# Patient Record
Sex: MALE | Race: WHITE | NOT HISPANIC OR LATINO | Employment: FULL TIME | URBAN - METROPOLITAN AREA
[De-identification: names, ages, dates, MRNs, and addresses within clinical notes are randomized per-mention and may not be internally consistent; named-entity substitution may affect disease eponyms.]

---

## 2017-01-18 ENCOUNTER — COMMUNICATION - HEALTHEAST (OUTPATIENT)
Dept: FAMILY MEDICINE | Facility: CLINIC | Age: 57
End: 2017-01-18

## 2017-01-18 DIAGNOSIS — K21.9 ACID REFLUX: ICD-10-CM

## 2017-01-19 ENCOUNTER — AMBULATORY - HEALTHEAST (OUTPATIENT)
Dept: FAMILY MEDICINE | Facility: CLINIC | Age: 57
End: 2017-01-19

## 2017-03-02 ENCOUNTER — RECORDS - HEALTHEAST (OUTPATIENT)
Dept: ADMINISTRATIVE | Facility: OTHER | Age: 57
End: 2017-03-02

## 2017-03-07 ENCOUNTER — COMMUNICATION - HEALTHEAST (OUTPATIENT)
Dept: FAMILY MEDICINE | Facility: CLINIC | Age: 57
End: 2017-03-07

## 2017-03-14 ENCOUNTER — AMBULATORY - HEALTHEAST (OUTPATIENT)
Dept: FAMILY MEDICINE | Facility: CLINIC | Age: 57
End: 2017-03-14

## 2017-03-14 ENCOUNTER — OFFICE VISIT - HEALTHEAST (OUTPATIENT)
Dept: FAMILY MEDICINE | Facility: CLINIC | Age: 57
End: 2017-03-14

## 2017-03-14 DIAGNOSIS — B00.9 HSV INFECTION: ICD-10-CM

## 2017-03-14 DIAGNOSIS — R10.13 EPIGASTRIC ABDOMINAL PAIN: ICD-10-CM

## 2017-03-15 ENCOUNTER — AMBULATORY - HEALTHEAST (OUTPATIENT)
Dept: FAMILY MEDICINE | Facility: CLINIC | Age: 57
End: 2017-03-15

## 2017-03-15 DIAGNOSIS — R10.9 ABDOMINAL PAIN: ICD-10-CM

## 2017-03-23 ENCOUNTER — HOSPITAL ENCOUNTER (OUTPATIENT)
Dept: CT IMAGING | Facility: CLINIC | Age: 57
Discharge: HOME | End: 2017-03-23
Attending: FAMILY MEDICINE

## 2017-03-23 DIAGNOSIS — R10.9 ABDOMINAL PAIN: ICD-10-CM

## 2017-03-24 ENCOUNTER — COMMUNICATION - HEALTHEAST (OUTPATIENT)
Dept: FAMILY MEDICINE | Facility: CLINIC | Age: 57
End: 2017-03-24

## 2017-03-27 ENCOUNTER — AMBULATORY - HEALTHEAST (OUTPATIENT)
Dept: FAMILY MEDICINE | Facility: CLINIC | Age: 57
End: 2017-03-27

## 2017-03-27 DIAGNOSIS — R10.9 ABDOMINAL PAIN: ICD-10-CM

## 2017-03-30 ENCOUNTER — COMMUNICATION - HEALTHEAST (OUTPATIENT)
Dept: FAMILY MEDICINE | Facility: CLINIC | Age: 57
End: 2017-03-30

## 2017-03-30 DIAGNOSIS — R10.10 UPPER ABDOMINAL PAIN: ICD-10-CM

## 2017-04-05 ENCOUNTER — HOSPITAL ENCOUNTER (OUTPATIENT)
Dept: ULTRASOUND IMAGING | Facility: CLINIC | Age: 57
Discharge: HOME | End: 2017-04-05
Attending: FAMILY MEDICINE

## 2017-04-05 ENCOUNTER — COMMUNICATION - HEALTHEAST (OUTPATIENT)
Dept: FAMILY MEDICINE | Facility: CLINIC | Age: 57
End: 2017-04-05

## 2017-04-05 DIAGNOSIS — R10.10 UPPER ABDOMINAL PAIN: ICD-10-CM

## 2017-04-28 ENCOUNTER — OFFICE VISIT - HEALTHEAST (OUTPATIENT)
Dept: CARDIOLOGY | Facility: CLINIC | Age: 57
End: 2017-04-28

## 2017-04-28 DIAGNOSIS — R07.9 CHEST PAIN, UNSPECIFIED TYPE: ICD-10-CM

## 2017-04-28 DIAGNOSIS — I10 ESSENTIAL HYPERTENSION: ICD-10-CM

## 2017-04-28 ASSESSMENT — MIFFLIN-ST. JEOR: SCORE: 1596.44

## 2017-05-10 ENCOUNTER — RECORDS - HEALTHEAST (OUTPATIENT)
Dept: ADMINISTRATIVE | Facility: OTHER | Age: 57
End: 2017-05-10

## 2017-06-22 ENCOUNTER — COMMUNICATION - HEALTHEAST (OUTPATIENT)
Dept: FAMILY MEDICINE | Facility: CLINIC | Age: 57
End: 2017-06-22

## 2017-06-22 DIAGNOSIS — K21.9 GASTROESOPHAGEAL REFLUX DISEASE WITHOUT ESOPHAGITIS: ICD-10-CM

## 2017-10-26 ENCOUNTER — COMMUNICATION - HEALTHEAST (OUTPATIENT)
Dept: FAMILY MEDICINE | Facility: CLINIC | Age: 57
End: 2017-10-26

## 2017-10-27 ENCOUNTER — AMBULATORY - HEALTHEAST (OUTPATIENT)
Dept: FAMILY MEDICINE | Facility: CLINIC | Age: 57
End: 2017-10-27

## 2017-10-27 ENCOUNTER — OFFICE VISIT - HEALTHEAST (OUTPATIENT)
Dept: FAMILY MEDICINE | Facility: CLINIC | Age: 57
End: 2017-10-27

## 2017-10-27 DIAGNOSIS — R05.3 CHRONIC COUGH: ICD-10-CM

## 2017-10-27 DIAGNOSIS — Z00.00 ROUTINE GENERAL MEDICAL EXAMINATION AT A HEALTH CARE FACILITY: ICD-10-CM

## 2017-10-27 DIAGNOSIS — Z12.11 SCREEN FOR COLON CANCER: ICD-10-CM

## 2017-10-27 ASSESSMENT — MIFFLIN-ST. JEOR: SCORE: 1625.93

## 2017-10-31 ENCOUNTER — COMMUNICATION - HEALTHEAST (OUTPATIENT)
Dept: LAB | Facility: CLINIC | Age: 57
End: 2017-10-31

## 2017-10-31 DIAGNOSIS — I10 HTN (HYPERTENSION): ICD-10-CM

## 2017-10-31 DIAGNOSIS — Z12.5 PROSTATE CANCER SCREENING: ICD-10-CM

## 2017-11-02 ENCOUNTER — AMBULATORY - HEALTHEAST (OUTPATIENT)
Dept: LAB | Facility: CLINIC | Age: 57
End: 2017-11-02

## 2017-11-02 DIAGNOSIS — Z12.5 PROSTATE CANCER SCREENING: ICD-10-CM

## 2017-11-02 DIAGNOSIS — I10 HTN (HYPERTENSION): ICD-10-CM

## 2017-11-02 LAB
CHOLEST SERPL-MCNC: 233 MG/DL
FASTING STATUS PATIENT QL REPORTED: YES
HDLC SERPL-MCNC: 59 MG/DL
LDLC SERPL CALC-MCNC: 152 MG/DL
PSA SERPL-MCNC: 0.8 NG/ML (ref 0–3.5)
TRIGL SERPL-MCNC: 110 MG/DL

## 2017-11-03 ENCOUNTER — COMMUNICATION - HEALTHEAST (OUTPATIENT)
Dept: FAMILY MEDICINE | Facility: CLINIC | Age: 57
End: 2017-11-03

## 2017-11-06 ENCOUNTER — COMMUNICATION - HEALTHEAST (OUTPATIENT)
Dept: FAMILY MEDICINE | Facility: CLINIC | Age: 57
End: 2017-11-06

## 2017-11-07 ENCOUNTER — AMBULATORY - HEALTHEAST (OUTPATIENT)
Dept: PULMONOLOGY | Facility: OTHER | Age: 57
End: 2017-11-07

## 2017-11-07 DIAGNOSIS — R05.9 COUGH: ICD-10-CM

## 2017-11-09 ENCOUNTER — COMMUNICATION - HEALTHEAST (OUTPATIENT)
Dept: PULMONOLOGY | Facility: OTHER | Age: 57
End: 2017-11-09

## 2017-11-13 ENCOUNTER — RECORDS - HEALTHEAST (OUTPATIENT)
Dept: ADMINISTRATIVE | Facility: OTHER | Age: 57
End: 2017-11-13

## 2017-11-13 ENCOUNTER — COMMUNICATION - HEALTHEAST (OUTPATIENT)
Dept: FAMILY MEDICINE | Facility: CLINIC | Age: 57
End: 2017-11-13

## 2017-11-20 ENCOUNTER — RECORDS - HEALTHEAST (OUTPATIENT)
Dept: ADMINISTRATIVE | Facility: OTHER | Age: 57
End: 2017-11-20

## 2017-11-27 ENCOUNTER — HOSPITAL ENCOUNTER (OUTPATIENT)
Dept: RESPIRATORY THERAPY | Facility: CLINIC | Age: 57
Discharge: HOME | End: 2017-11-27
Attending: INTERNAL MEDICINE

## 2017-11-27 DIAGNOSIS — R05.9 COUGH: ICD-10-CM

## 2017-12-13 ENCOUNTER — OFFICE VISIT - HEALTHEAST (OUTPATIENT)
Dept: PULMONOLOGY | Facility: OTHER | Age: 57
End: 2017-12-13

## 2017-12-13 DIAGNOSIS — R05.9 COUGH: ICD-10-CM

## 2017-12-14 ENCOUNTER — COMMUNICATION - HEALTHEAST (OUTPATIENT)
Dept: FAMILY MEDICINE | Facility: CLINIC | Age: 57
End: 2017-12-14

## 2017-12-26 ENCOUNTER — AMBULATORY - HEALTHEAST (OUTPATIENT)
Dept: PULMONOLOGY | Facility: OTHER | Age: 57
End: 2017-12-26

## 2017-12-26 DIAGNOSIS — R05.9 COUGH: ICD-10-CM

## 2017-12-27 ENCOUNTER — COMMUNICATION - HEALTHEAST (OUTPATIENT)
Dept: FAMILY MEDICINE | Facility: CLINIC | Age: 57
End: 2017-12-27

## 2018-02-01 ENCOUNTER — COMMUNICATION - HEALTHEAST (OUTPATIENT)
Dept: FAMILY MEDICINE | Facility: CLINIC | Age: 58
End: 2018-02-01

## 2018-02-06 ENCOUNTER — COMMUNICATION - HEALTHEAST (OUTPATIENT)
Dept: FAMILY MEDICINE | Facility: CLINIC | Age: 58
End: 2018-02-06

## 2018-02-08 ENCOUNTER — RECORDS - HEALTHEAST (OUTPATIENT)
Dept: ADMINISTRATIVE | Facility: OTHER | Age: 58
End: 2018-02-08

## 2018-06-26 ENCOUNTER — COMMUNICATION - HEALTHEAST (OUTPATIENT)
Dept: FAMILY MEDICINE | Facility: CLINIC | Age: 58
End: 2018-06-26

## 2018-06-26 DIAGNOSIS — K21.9 GASTROESOPHAGEAL REFLUX DISEASE WITHOUT ESOPHAGITIS: ICD-10-CM

## 2018-06-26 DIAGNOSIS — R05.9 COUGH: ICD-10-CM

## 2018-09-04 ENCOUNTER — COMMUNICATION - HEALTHEAST (OUTPATIENT)
Dept: FAMILY MEDICINE | Facility: CLINIC | Age: 58
End: 2018-09-04

## 2018-09-05 ENCOUNTER — COMMUNICATION - HEALTHEAST (OUTPATIENT)
Dept: FAMILY MEDICINE | Facility: CLINIC | Age: 58
End: 2018-09-05

## 2018-09-05 DIAGNOSIS — K92.1 HEMATOCHEZIA: ICD-10-CM

## 2018-09-12 ENCOUNTER — COMMUNICATION - HEALTHEAST (OUTPATIENT)
Dept: FAMILY MEDICINE | Facility: CLINIC | Age: 58
End: 2018-09-12

## 2018-09-12 DIAGNOSIS — N52.9 ED (ERECTILE DYSFUNCTION): ICD-10-CM

## 2018-09-14 ENCOUNTER — COMMUNICATION - HEALTHEAST (OUTPATIENT)
Dept: FAMILY MEDICINE | Facility: CLINIC | Age: 58
End: 2018-09-14

## 2018-09-14 DIAGNOSIS — K21.9 GASTROESOPHAGEAL REFLUX DISEASE WITHOUT ESOPHAGITIS: ICD-10-CM

## 2018-09-18 ENCOUNTER — RECORDS - HEALTHEAST (OUTPATIENT)
Dept: ADMINISTRATIVE | Facility: OTHER | Age: 58
End: 2018-09-18

## 2018-10-05 ENCOUNTER — COMMUNICATION - HEALTHEAST (OUTPATIENT)
Dept: FAMILY MEDICINE | Facility: CLINIC | Age: 58
End: 2018-10-05

## 2018-10-05 DIAGNOSIS — B00.9 HSV INFECTION: ICD-10-CM

## 2018-12-13 ENCOUNTER — COMMUNICATION - HEALTHEAST (OUTPATIENT)
Dept: FAMILY MEDICINE | Facility: CLINIC | Age: 58
End: 2018-12-13

## 2018-12-13 DIAGNOSIS — K21.9 GASTROESOPHAGEAL REFLUX DISEASE WITHOUT ESOPHAGITIS: ICD-10-CM

## 2018-12-17 ENCOUNTER — OFFICE VISIT - HEALTHEAST (OUTPATIENT)
Dept: FAMILY MEDICINE | Facility: CLINIC | Age: 58
End: 2018-12-17

## 2018-12-17 ENCOUNTER — AMBULATORY - HEALTHEAST (OUTPATIENT)
Dept: FAMILY MEDICINE | Facility: CLINIC | Age: 58
End: 2018-12-17

## 2018-12-17 DIAGNOSIS — B00.9 HSV INFECTION: ICD-10-CM

## 2018-12-17 DIAGNOSIS — E78.5 HYPERLIPIDEMIA LDL GOAL <130: ICD-10-CM

## 2018-12-17 DIAGNOSIS — Z00.00 ROUTINE GENERAL MEDICAL EXAMINATION AT A HEALTH CARE FACILITY: ICD-10-CM

## 2018-12-17 LAB
ALBUMIN SERPL-MCNC: 3.9 G/DL (ref 3.5–5)
ALP SERPL-CCNC: 46 U/L (ref 45–120)
ALT SERPL W P-5'-P-CCNC: 33 U/L (ref 0–45)
ANION GAP SERPL CALCULATED.3IONS-SCNC: 7 MMOL/L (ref 5–18)
AST SERPL W P-5'-P-CCNC: 28 U/L (ref 0–40)
BILIRUB SERPL-MCNC: 0.7 MG/DL (ref 0–1)
BUN SERPL-MCNC: 13 MG/DL (ref 8–22)
CALCIUM SERPL-MCNC: 9.4 MG/DL (ref 8.5–10.5)
CHLORIDE BLD-SCNC: 106 MMOL/L (ref 98–107)
CHOLEST SERPL-MCNC: 212 MG/DL
CO2 SERPL-SCNC: 27 MMOL/L (ref 22–31)
CREAT SERPL-MCNC: 1.02 MG/DL (ref 0.7–1.3)
FASTING STATUS PATIENT QL REPORTED: YES
GFR SERPL CREATININE-BSD FRML MDRD: >60 ML/MIN/1.73M2
GLUCOSE BLD-MCNC: 98 MG/DL (ref 70–125)
HDLC SERPL-MCNC: 49 MG/DL
LDLC SERPL CALC-MCNC: 138 MG/DL
POTASSIUM BLD-SCNC: 4.6 MMOL/L (ref 3.5–5)
PROT SERPL-MCNC: 6.9 G/DL (ref 6–8)
PSA SERPL-MCNC: 1.1 NG/ML (ref 0–3.5)
SODIUM SERPL-SCNC: 140 MMOL/L (ref 136–145)
TRIGL SERPL-MCNC: 127 MG/DL

## 2018-12-17 RX ORDER — ACYCLOVIR 800 MG/1
TABLET ORAL
Qty: 20 TABLET | Refills: 3 | Status: SHIPPED | OUTPATIENT
Start: 2018-12-17 | End: 2021-09-27

## 2018-12-17 ASSESSMENT — MIFFLIN-ST. JEOR: SCORE: 1634.99

## 2019-02-13 ENCOUNTER — OFFICE VISIT - HEALTHEAST (OUTPATIENT)
Dept: FAMILY MEDICINE | Facility: CLINIC | Age: 59
End: 2019-02-13

## 2019-02-13 DIAGNOSIS — L25.9 CONTACT DERMATITIS, UNSPECIFIED CONTACT DERMATITIS TYPE, UNSPECIFIED TRIGGER: ICD-10-CM

## 2019-02-13 ASSESSMENT — MIFFLIN-ST. JEOR: SCORE: 1650.87

## 2019-02-18 ENCOUNTER — COMMUNICATION - HEALTHEAST (OUTPATIENT)
Dept: FAMILY MEDICINE | Facility: CLINIC | Age: 59
End: 2019-02-18

## 2019-02-20 ENCOUNTER — RECORDS - HEALTHEAST (OUTPATIENT)
Dept: ADMINISTRATIVE | Facility: OTHER | Age: 59
End: 2019-02-20

## 2019-08-09 ENCOUNTER — COMMUNICATION - HEALTHEAST (OUTPATIENT)
Dept: FAMILY MEDICINE | Facility: CLINIC | Age: 59
End: 2019-08-09

## 2019-08-09 DIAGNOSIS — J31.0 CHRONIC RHINITIS: ICD-10-CM

## 2019-08-19 ENCOUNTER — AMBULATORY - HEALTHEAST (OUTPATIENT)
Dept: FAMILY MEDICINE | Facility: CLINIC | Age: 59
End: 2019-08-19

## 2019-08-23 ENCOUNTER — OFFICE VISIT - HEALTHEAST (OUTPATIENT)
Dept: FAMILY MEDICINE | Facility: CLINIC | Age: 59
End: 2019-08-23

## 2019-08-23 DIAGNOSIS — L30.9 DERMATITIS: ICD-10-CM

## 2019-08-23 DIAGNOSIS — R41.3 MEMORY DIFFICULTIES: ICD-10-CM

## 2019-08-23 DIAGNOSIS — K92.1 HEMATOCHEZIA: ICD-10-CM

## 2019-08-23 LAB
ERYTHROCYTE [DISTWIDTH] IN BLOOD BY AUTOMATED COUNT: 11.2 % (ref 11–14.5)
HCT VFR BLD AUTO: 44.2 % (ref 40–54)
HGB BLD-MCNC: 15 G/DL (ref 14–18)
MCH RBC QN AUTO: 33.3 PG (ref 27–34)
MCHC RBC AUTO-ENTMCNC: 34 G/DL (ref 32–36)
MCV RBC AUTO: 98 FL (ref 80–100)
PLATELET # BLD AUTO: 261 THOU/UL (ref 140–440)
PMV BLD AUTO: 6.9 FL (ref 7–10)
RBC # BLD AUTO: 4.51 MILL/UL (ref 4.4–6.2)
WBC: 4.6 THOU/UL (ref 4–11)

## 2019-11-05 ENCOUNTER — COMMUNICATION - HEALTHEAST (OUTPATIENT)
Dept: FAMILY MEDICINE | Facility: CLINIC | Age: 59
End: 2019-11-05

## 2019-11-28 ENCOUNTER — COMMUNICATION - HEALTHEAST (OUTPATIENT)
Dept: FAMILY MEDICINE | Facility: CLINIC | Age: 59
End: 2019-11-28

## 2019-11-28 DIAGNOSIS — K21.9 GASTROESOPHAGEAL REFLUX DISEASE WITHOUT ESOPHAGITIS: ICD-10-CM

## 2019-12-18 ENCOUNTER — RECORDS - HEALTHEAST (OUTPATIENT)
Dept: ADMINISTRATIVE | Facility: OTHER | Age: 59
End: 2019-12-18

## 2019-12-20 ENCOUNTER — OFFICE VISIT - HEALTHEAST (OUTPATIENT)
Dept: FAMILY MEDICINE | Facility: CLINIC | Age: 59
End: 2019-12-20

## 2019-12-20 DIAGNOSIS — E78.5 HYPERLIPIDEMIA LDL GOAL <130: ICD-10-CM

## 2019-12-20 DIAGNOSIS — B00.9 HSV INFECTION: ICD-10-CM

## 2019-12-20 DIAGNOSIS — Z00.00 ROUTINE GENERAL MEDICAL EXAMINATION AT A HEALTH CARE FACILITY: ICD-10-CM

## 2019-12-20 LAB
ALBUMIN SERPL-MCNC: 4.1 G/DL (ref 3.5–5)
ALP SERPL-CCNC: 52 U/L (ref 45–120)
ALT SERPL W P-5'-P-CCNC: 39 U/L (ref 0–45)
ANION GAP SERPL CALCULATED.3IONS-SCNC: 9 MMOL/L (ref 5–18)
AST SERPL W P-5'-P-CCNC: 27 U/L (ref 0–40)
BILIRUB SERPL-MCNC: 0.4 MG/DL (ref 0–1)
BUN SERPL-MCNC: 15 MG/DL (ref 8–22)
CALCIUM SERPL-MCNC: 9.7 MG/DL (ref 8.5–10.5)
CHLORIDE BLD-SCNC: 106 MMOL/L (ref 98–107)
CHOLEST SERPL-MCNC: 223 MG/DL
CO2 SERPL-SCNC: 27 MMOL/L (ref 22–31)
CREAT SERPL-MCNC: 1.18 MG/DL (ref 0.7–1.3)
FASTING STATUS PATIENT QL REPORTED: YES
GFR SERPL CREATININE-BSD FRML MDRD: >60 ML/MIN/1.73M2
GLUCOSE BLD-MCNC: 96 MG/DL (ref 70–125)
HDLC SERPL-MCNC: 44 MG/DL
LDLC SERPL CALC-MCNC: 145 MG/DL
POTASSIUM BLD-SCNC: 4.5 MMOL/L (ref 3.5–5)
PROT SERPL-MCNC: 7.2 G/DL (ref 6–8)
PSA SERPL-MCNC: 2.5 NG/ML (ref 0–3.5)
SODIUM SERPL-SCNC: 142 MMOL/L (ref 136–145)
TRIGL SERPL-MCNC: 172 MG/DL

## 2019-12-20 RX ORDER — CETIRIZINE HYDROCHLORIDE 10 MG/1
10 TABLET ORAL 2 TIMES DAILY
Status: SHIPPED | COMMUNITY
Start: 2019-12-18

## 2019-12-20 ASSESSMENT — MIFFLIN-ST. JEOR: SCORE: 1650.87

## 2019-12-23 ENCOUNTER — COMMUNICATION - HEALTHEAST (OUTPATIENT)
Dept: FAMILY MEDICINE | Facility: CLINIC | Age: 59
End: 2019-12-23

## 2019-12-23 ENCOUNTER — AMBULATORY - HEALTHEAST (OUTPATIENT)
Dept: NURSING | Facility: CLINIC | Age: 59
End: 2019-12-23

## 2019-12-23 DIAGNOSIS — Z00.00 HEALTHCARE MAINTENANCE: ICD-10-CM

## 2020-01-29 ENCOUNTER — RECORDS - HEALTHEAST (OUTPATIENT)
Dept: ADMINISTRATIVE | Facility: OTHER | Age: 60
End: 2020-01-29

## 2020-03-02 ENCOUNTER — COMMUNICATION - HEALTHEAST (OUTPATIENT)
Dept: FAMILY MEDICINE | Facility: CLINIC | Age: 60
End: 2020-03-02

## 2020-03-11 ENCOUNTER — COMMUNICATION - HEALTHEAST (OUTPATIENT)
Dept: FAMILY MEDICINE | Facility: CLINIC | Age: 60
End: 2020-03-11

## 2020-03-11 DIAGNOSIS — K21.9 GASTROESOPHAGEAL REFLUX DISEASE WITHOUT ESOPHAGITIS: ICD-10-CM

## 2020-03-11 DIAGNOSIS — Z00.00 HEALTHCARE MAINTENANCE: ICD-10-CM

## 2020-03-17 ENCOUNTER — COMMUNICATION - HEALTHEAST (OUTPATIENT)
Dept: FAMILY MEDICINE | Facility: CLINIC | Age: 60
End: 2020-03-17

## 2020-03-18 ENCOUNTER — AMBULATORY - HEALTHEAST (OUTPATIENT)
Dept: NURSING | Facility: CLINIC | Age: 60
End: 2020-03-18

## 2020-03-18 DIAGNOSIS — Z00.00 HEALTHCARE MAINTENANCE: ICD-10-CM

## 2020-05-08 ENCOUNTER — COMMUNICATION - HEALTHEAST (OUTPATIENT)
Dept: LAB | Facility: CLINIC | Age: 60
End: 2020-05-08

## 2020-05-08 ENCOUNTER — AMBULATORY - HEALTHEAST (OUTPATIENT)
Dept: LAB | Facility: CLINIC | Age: 60
End: 2020-05-08

## 2020-05-08 DIAGNOSIS — R97.20 ELEVATED PROSTATE SPECIFIC ANTIGEN (PSA): ICD-10-CM

## 2020-05-08 LAB — PSA SERPL-MCNC: 1.8 NG/ML (ref 0–3.5)

## 2020-07-07 ENCOUNTER — COMMUNICATION - HEALTHEAST (OUTPATIENT)
Dept: FAMILY MEDICINE | Facility: CLINIC | Age: 60
End: 2020-07-07

## 2020-07-07 DIAGNOSIS — N52.9 ED (ERECTILE DYSFUNCTION): ICD-10-CM

## 2020-09-10 ENCOUNTER — COMMUNICATION - HEALTHEAST (OUTPATIENT)
Dept: FAMILY MEDICINE | Facility: CLINIC | Age: 60
End: 2020-09-10

## 2020-09-10 DIAGNOSIS — N52.9 ED (ERECTILE DYSFUNCTION): ICD-10-CM

## 2020-10-27 ENCOUNTER — COMMUNICATION - HEALTHEAST (OUTPATIENT)
Dept: FAMILY MEDICINE | Facility: CLINIC | Age: 60
End: 2020-10-27

## 2020-10-27 DIAGNOSIS — N52.9 ERECTILE DYSFUNCTION, UNSPECIFIED ERECTILE DYSFUNCTION TYPE: ICD-10-CM

## 2020-10-27 RX ORDER — SILDENAFIL 100 MG/1
100 TABLET, FILM COATED ORAL PRN
Qty: 30 TABLET | Refills: 3 | Status: SHIPPED | OUTPATIENT
Start: 2020-10-27 | End: 2021-11-10

## 2020-11-20 ENCOUNTER — COMMUNICATION - HEALTHEAST (OUTPATIENT)
Dept: FAMILY MEDICINE | Facility: CLINIC | Age: 60
End: 2020-11-20

## 2020-11-20 DIAGNOSIS — K21.9 GASTROESOPHAGEAL REFLUX DISEASE WITHOUT ESOPHAGITIS: ICD-10-CM

## 2021-01-04 ENCOUNTER — AMBULATORY - HEALTHEAST (OUTPATIENT)
Dept: FAMILY MEDICINE | Facility: CLINIC | Age: 61
End: 2021-01-04

## 2021-01-04 DIAGNOSIS — Z00.00 ROUTINE GENERAL MEDICAL EXAMINATION AT A HEALTH CARE FACILITY: ICD-10-CM

## 2021-01-04 DIAGNOSIS — E78.5 HYPERLIPIDEMIA LDL GOAL <130: ICD-10-CM

## 2021-01-15 ENCOUNTER — COMMUNICATION - HEALTHEAST (OUTPATIENT)
Dept: FAMILY MEDICINE | Facility: CLINIC | Age: 61
End: 2021-01-15

## 2021-01-15 DIAGNOSIS — K21.9 GASTROESOPHAGEAL REFLUX DISEASE WITHOUT ESOPHAGITIS: ICD-10-CM

## 2021-01-17 RX ORDER — SUCRALFATE 1 G/1
TABLET ORAL
Qty: 90 TABLET | Refills: 3 | Status: SHIPPED | OUTPATIENT
Start: 2021-01-17 | End: 2021-11-08

## 2021-01-19 ENCOUNTER — AMBULATORY - HEALTHEAST (OUTPATIENT)
Dept: LAB | Facility: CLINIC | Age: 61
End: 2021-01-19

## 2021-01-19 DIAGNOSIS — E78.5 HYPERLIPIDEMIA LDL GOAL <130: ICD-10-CM

## 2021-01-19 DIAGNOSIS — Z00.00 ROUTINE GENERAL MEDICAL EXAMINATION AT A HEALTH CARE FACILITY: ICD-10-CM

## 2021-01-19 LAB
ALBUMIN SERPL-MCNC: 4.3 G/DL (ref 3.5–5)
ALP SERPL-CCNC: 54 U/L (ref 45–120)
ALT SERPL W P-5'-P-CCNC: 19 U/L (ref 0–45)
ANION GAP SERPL CALCULATED.3IONS-SCNC: 10 MMOL/L (ref 5–18)
AST SERPL W P-5'-P-CCNC: 20 U/L (ref 0–40)
BILIRUB SERPL-MCNC: 0.6 MG/DL (ref 0–1)
BUN SERPL-MCNC: 22 MG/DL (ref 8–22)
CALCIUM SERPL-MCNC: 9.5 MG/DL (ref 8.5–10.5)
CHLORIDE BLD-SCNC: 105 MMOL/L (ref 98–107)
CHOLEST SERPL-MCNC: 206 MG/DL
CO2 SERPL-SCNC: 25 MMOL/L (ref 22–31)
CREAT SERPL-MCNC: 1.16 MG/DL (ref 0.7–1.3)
FASTING STATUS PATIENT QL REPORTED: YES
GFR SERPL CREATININE-BSD FRML MDRD: >60 ML/MIN/1.73M2
GLUCOSE BLD-MCNC: 96 MG/DL (ref 70–125)
HDLC SERPL-MCNC: 43 MG/DL
LDLC SERPL CALC-MCNC: 131 MG/DL
POTASSIUM BLD-SCNC: 4.6 MMOL/L (ref 3.5–5)
PROT SERPL-MCNC: 7.2 G/DL (ref 6–8)
PSA SERPL-MCNC: 3.8 NG/ML (ref 0–3.5)
SODIUM SERPL-SCNC: 140 MMOL/L (ref 136–145)
TRIGL SERPL-MCNC: 162 MG/DL

## 2021-01-25 ENCOUNTER — OFFICE VISIT - HEALTHEAST (OUTPATIENT)
Dept: FAMILY MEDICINE | Facility: CLINIC | Age: 61
End: 2021-01-25

## 2021-01-25 DIAGNOSIS — K21.9 GASTROESOPHAGEAL REFLUX DISEASE WITHOUT ESOPHAGITIS: ICD-10-CM

## 2021-01-25 DIAGNOSIS — R97.20 ELEVATED PROSTATE SPECIFIC ANTIGEN (PSA): ICD-10-CM

## 2021-01-25 DIAGNOSIS — Z00.00 ROUTINE GENERAL MEDICAL EXAMINATION AT A HEALTH CARE FACILITY: ICD-10-CM

## 2021-01-25 ASSESSMENT — MIFFLIN-ST. JEOR: SCORE: 1639.53

## 2021-01-28 ENCOUNTER — RECORDS - HEALTHEAST (OUTPATIENT)
Dept: ADMINISTRATIVE | Facility: OTHER | Age: 61
End: 2021-01-28

## 2021-01-29 ENCOUNTER — RECORDS - HEALTHEAST (OUTPATIENT)
Dept: ADMINISTRATIVE | Facility: OTHER | Age: 61
End: 2021-01-29

## 2021-02-21 ENCOUNTER — COMMUNICATION - HEALTHEAST (OUTPATIENT)
Dept: FAMILY MEDICINE | Facility: CLINIC | Age: 61
End: 2021-02-21

## 2021-02-21 DIAGNOSIS — K21.9 GASTROESOPHAGEAL REFLUX DISEASE WITHOUT ESOPHAGITIS: ICD-10-CM

## 2021-02-21 RX ORDER — PANTOPRAZOLE SODIUM 40 MG/1
40 TABLET, DELAYED RELEASE ORAL DAILY
Qty: 90 TABLET | Refills: 3 | Status: SHIPPED | OUTPATIENT
Start: 2021-02-21 | End: 2022-02-10

## 2021-04-05 ENCOUNTER — COMMUNICATION - HEALTHEAST (OUTPATIENT)
Dept: FAMILY MEDICINE | Facility: CLINIC | Age: 61
End: 2021-04-05

## 2021-04-05 DIAGNOSIS — H93.13 TINNITUS, BILATERAL: ICD-10-CM

## 2021-04-12 ENCOUNTER — OFFICE VISIT - HEALTHEAST (OUTPATIENT)
Dept: AUDIOLOGY | Facility: CLINIC | Age: 61
End: 2021-04-12

## 2021-04-12 ENCOUNTER — OFFICE VISIT - HEALTHEAST (OUTPATIENT)
Dept: OTOLARYNGOLOGY | Facility: CLINIC | Age: 61
End: 2021-04-12

## 2021-04-12 DIAGNOSIS — H93.13 TINNITUS, BILATERAL: ICD-10-CM

## 2021-04-12 DIAGNOSIS — H90.3 SENSORINEURAL HEARING LOSS (SNHL) OF BOTH EARS: ICD-10-CM

## 2021-04-12 DIAGNOSIS — H61.813: ICD-10-CM

## 2021-04-12 DIAGNOSIS — H90.3 SENSORINEURAL HEARING LOSS, BILATERAL: ICD-10-CM

## 2021-04-12 RX ORDER — TAMSULOSIN HYDROCHLORIDE 0.4 MG/1
CAPSULE ORAL
Status: SHIPPED | COMMUNITY
Start: 2021-03-19

## 2021-05-26 ENCOUNTER — RECORDS - HEALTHEAST (OUTPATIENT)
Dept: ADMINISTRATIVE | Facility: CLINIC | Age: 61
End: 2021-05-26

## 2021-05-30 ENCOUNTER — RECORDS - HEALTHEAST (OUTPATIENT)
Dept: ADMINISTRATIVE | Facility: CLINIC | Age: 61
End: 2021-05-30

## 2021-05-30 VITALS — HEIGHT: 68 IN | BODY MASS INDEX: 27.13 KG/M2 | WEIGHT: 179 LBS

## 2021-05-30 VITALS — WEIGHT: 184.7 LBS | BODY MASS INDEX: 28.08 KG/M2

## 2021-05-31 VITALS — BODY MASS INDEX: 27.02 KG/M2 | WEIGHT: 183 LBS

## 2021-05-31 VITALS — HEIGHT: 69 IN | WEIGHT: 182 LBS | BODY MASS INDEX: 26.96 KG/M2

## 2021-05-31 NOTE — TELEPHONE ENCOUNTER
Medication Question or Clarification  Who is calling: Pharmacy: [facility] Pharmacy Technician  What medication are you calling about? (include dose and sig)    Disp Refills Start End JOSEFINA   fluticasone (FLONASE) 50 mcg/actuation nasal spray (Discontinued) 16 g 6 2017 No   Sig - Route: 1 spray into each nostril daily. - Nasal   Sent to pharmacy as: fluticasone (FLONASE) 50 mcg/actuation nasal spray   Reason for Discontinue: Reorder   E-Prescribing Status: Receipt confirmed by pharmacy (2017 12:01 PM CST)     Who prescribed the medication?: Neftali Coleman MD   What is your question/concern?: Swetha, pharmacy technician at [facility], called requesting clarification of the medication listed above.      Caller stated the patient has contacted the pharmacy requesting this medication be refilled, but the caller reported the prescription has been .  Caller is asking if the this is an active medication for the patient?    Caller stated if the patient is to be on this medication a new prescription would need to be sent to the pharmacy.  Pharmacy: [facility]  Okay to leave a detailed message?: No  [phone]  Option 2  Reference Number: [id number]  Site CMT - Please call the pharmacy to obtain any additional needed information.

## 2021-05-31 NOTE — TELEPHONE ENCOUNTER
Please send a new prescription for flonase to the pharmacy if appropriate     Last prescribed on 6/27/2018 by Sonya Romo CNP

## 2021-05-31 NOTE — PROGRESS NOTES
Patient ID: Bi Bae is a 59 y.o. male.  /72   Pulse 60   Wt 186 lb (84.4 kg)   SpO2 98%   BMI 28.28 kg/m      Assessment/Plan:                Diagnoses and all orders for this visit:    Hematochezia  -     HM2(CBC w/o Differential)    Dermatitis    Memory difficulties      DISCUSSION  On evaluation for the hematochezia including external exam, digital rectal exam and anoscopy no etiology or potential etiology is apparent.  This is not surprising given the amount of time since his last episode.  I suspect that this is likely to be a relatively benign etiology such as possibly bleeding internal hemorrhoids but I was not able to confirm.  Discussed options for evaluation elected to take a more conservative approach by adding fiber supplementation, checking a hemogram and then continuing to monitor.  Will give consideration to referral and further work-up if this is a persistent issue or if there is signs of anemia.    For the dermatitis continue to follow advice of skin doctor no indication for any alteration in treatment at this time.    For the memory difficulties see discussion below continue to monitor closely and ensure appropriate routine health preventive measures.  Subjective:     HPI    Bi Bae is a 59 y.o. male who is here today to discuss several concerns.  He has had intermittent bouts of hematochezia over the course of the summer.  He reports 4 bouts since June where he noted a large amount of bright red blood with passage of a bowel movement.  This is usually been associated with being more constipated over a couple of days.  He notes no pain with bowel movements during these episodes or otherwise.  He has no history of hemorrhoids.  He did have a colonoscopy with benign polyps found in September 2018.  He otherwise feels well denies dizziness lightheadedness.  Last bout of hematochezia was about 2 weeks ago.    He has been dealing with dermatitis since this past winter.  Had  seen dermatology and was placed on a course of oral steroid.  Uncertain as to cause.  He is taking nonsedating antihistamine which he finds helpful but rash still is present diffusely presenting as itching and redness.    He brings up concerns regarding memory difficulties.  Reports occasional word finding difficulty.  Reports forgetting the name of his daughter-in-law and forgetting an important password for a computer account.  He is concerned about this in particular because his mother had Alzheimer's.  We discussed considerations regarding memory difficulty there is no easily identifiable situation.  No medications that are obvious that would contribute.  No identifiable mental health consideration on basic questioning and there is no identifiable sleep concern based on basic questioning.  We discussed a multitude of potential etiologies for such symptoms ranging from relatively normal phenomenon to a more significant disease process.  He understands this and we came up with a plan to continue to monitor closely, to ensure adequate sleep, stress management and to monitor medications as well as the frequency and severity of further symptoms if at all.    Review of Systems  Complete review of systems is obtained.  Other than the specific considerations noted above complete review of systems is negative.    Objective:   Medications:  Current Outpatient Medications   Medication Sig     acyclovir (ZOVIRAX) 800 MG tablet TAKE 1 TABLET BY MOUTH FOUR TIMES DAILY..     fluticasone propionate (FLONASE) 50 mcg/actuation nasal spray 1 spray into each nostril daily.     pantoprazole (PROTONIX) 40 MG tablet TAKE 1 TABLET DAILY. DUE   FOR OFFICE VISIT PRIOR TO  FURTHER REFILLS.     sildenafil, antihypertensive, (REVATIO) 20 mg tablet TAKE ONE TABLET THREE TIMES A DAY     sucralfate (CARAFATE) 1 gram tablet Take 1 g by mouth daily .           triamcinolone (KENALOG) 0.1 % cream Apply to affected areas twice daily for two weeks.      fluocinonide (LIDEX) 0.05 % ointment      predniSONE (DELTASONE) 10 mg tablet      Allergies:  No Known Allergies    Tobacco:   reports that he has never smoked. He has quit using smokeless tobacco.     Physical Exam      /72   Pulse 60   Wt 186 lb (84.4 kg)   SpO2 98%   BMI 28.28 kg/m        General: Patient is alert no signs of distress    Lungs: Good air movement throughout no wheeze or crackle    Heart: Regular rate and rhythm no murmur    Abdomen: Soft nondistended nontender no organomegaly or masses    Anal rectal exam: No external findings of hemorrhoids, irritation or other.  On digital rectal exam normal prostate no palpable abnormalities.  On anoscopy grossly normal without any signs of bleeding or irritation or other abnormalities.

## 2021-06-02 VITALS — HEIGHT: 68 IN | BODY MASS INDEX: 28.95 KG/M2 | WEIGHT: 191 LBS

## 2021-06-02 VITALS — BODY MASS INDEX: 28.42 KG/M2 | WEIGHT: 187.5 LBS | HEIGHT: 68 IN

## 2021-06-03 VITALS — WEIGHT: 186 LBS | BODY MASS INDEX: 28.28 KG/M2

## 2021-06-03 NOTE — TELEPHONE ENCOUNTER
Refill Approved    Rx renewed per Medication Renewal Policy. Medication was last renewed on 12/14/18.    Chanel Beckwith, [facility] Connection Triage/Med Refill 11/28/2019     Requested Prescriptions   Pending Prescriptions Disp Refills     pantoprazole (PROTONIX) 40 MG tablet [Pharmacy Med Name: PANTOPRAZOLE TAB 40MG DR] 90 tablet 3     Sig: TAKE 1 TABLET DAILY. DUE   FOR OFFICE VISIT PRIOR TO  FURTHER REFILLS.       GI Medications Refill Protocol Passed - 11/28/2019  4:28 AM        Passed - PCP or prescribing provider visit in last 12 or next 3 months.     Last office visit with prescriber/PCP: 8/23/2019 Neftali Coleman MD OR same dept: 8/23/2019 Neftali Coleman MD OR same specialty: 8/23/2019 Neftali Coleman MD  Last physical: 12/17/2018 Last MTM visit: Visit date not found   Next visit within 3 mo: Visit date not found  Next physical within 3 mo: Visit date not found  Prescriber OR PCP: Neftali Coleman MD  Last diagnosis associated with med order: 1. Gastroesophageal reflux disease without esophagitis  - pantoprazole (PROTONIX) 40 MG tablet [Pharmacy Med Name: PANTOPRAZOLE TAB 40MG DR]; TAKE 1 TABLET DAILY. DUE   FOR OFFICE VISIT PRIOR TO  FURTHER REFILLS.  Dispense: 90 tablet; Refill: 3    If protocol passes may refill for 12 months if within 3 months of last provider visit (or a total of 15 months).

## 2021-06-04 VITALS
OXYGEN SATURATION: 97 % | BODY MASS INDEX: 28.95 KG/M2 | HEIGHT: 68 IN | SYSTOLIC BLOOD PRESSURE: 124 MMHG | WEIGHT: 191 LBS | DIASTOLIC BLOOD PRESSURE: 72 MMHG | HEART RATE: 66 BPM

## 2021-06-04 NOTE — PROGRESS NOTES
" Patient ID: Bi Bae is a 59 y.o. male.  /72   Pulse 66   Ht 5' 8\" (1.727 m)   Wt 191 lb (86.6 kg)   SpO2 97%   BMI 29.04 kg/m      Assessment/Plan:                Diagnoses and all orders for this visit:    Routine general medical examination at a health care facility  -     PSA, Annual Screen (Prostatic-Specific Antigen)    HSV infection    Hyperlipidemia LDL goal <130  -     Lipid Cascade  -     Comprehensive Metabolic Panel         DISCUSSION  Labs as above.  See discussion below  Subjective:     HPI    Bi Bae is a generally healthy 59-year-old male.  Previous history of hypertension but blood pressure well controlled without medication currently.  He does have hyperlipidemia he had tremendous improvement in his LDL cholesterol reading last year.  He states he is remained active and continue to eat a healthy balanced diet.  Weight is remained relatively stable but is up slightly.  Discussed reevaluation of cholesterol and overall vascular disease risk.  Denies chest pain or shortness of breath.  He has a history of HSV infections of the skin.  No current outbreak.  Keeps acyclovir on hand for self treatment.  No changes to social and family history which are reviewed.  Reviewed routine health prevention.  Discussed considering shingles vaccine but otherwise immunizations are up-to-date.  Reviewed other aspects of routine health prevention as noted.      Review of Systems  Complete review of systems is obtained.  Other than the specific considerations noted above complete review of systems is negative.    Objective:   Medications:  Current Outpatient Medications   Medication Sig     acyclovir (ZOVIRAX) 800 MG tablet TAKE 1 TABLET BY MOUTH FOUR TIMES DAILY..     cetirizine (ZYRTEC) 10 MG tablet Take 10 mg by mouth 2 (two) times a day.     fluocinonide (LIDEX) 0.05 % ointment      fluticasone propionate (FLONASE) 50 mcg/actuation nasal spray 1 spray into each nostril daily.     " pantoprazole (PROTONIX) 40 MG tablet TAKE 1 TABLET DAILY. DUE   FOR OFFICE VISIT PRIOR TO  FURTHER REFILLS.     sildenafil, antihypertensive, (REVATIO) 20 mg tablet TAKE ONE TABLET THREE TIMES A DAY     sucralfate (CARAFATE) 1 gram tablet Take 1 g by mouth daily .           triamcinolone (KENALOG) 0.1 % cream Apply to affected areas twice daily for two weeks.     Allergies:  No Known Allergies    Tobacco:   reports that he has never smoked. He has quit using smokeless tobacco.    HEALTH PREVENTION    General  Dental care: Discussed the importance of regular dental care.  Eye care: Discussed importance of routine eye exams for glaucoma screening  Exercise: Maintain exercise regiment.  Diet: Continue to strive for a healthy balanced diet    Wt Readings from Last 3 Encounters:   12/20/19 191 lb (86.6 kg)   08/23/19 186 lb (84.4 kg)   02/13/19 191 lb (86.6 kg)     Body mass index is 29.04 kg/m .    The following high BMI interventions were performed this visit: encouragement to exercise    Cancer screening  Testicular cancer:is discussed and exam performed today  Skin cancer: Discussed sun burn prevention and self monitoring.  Colon cancer: Colon cancer screening is discussed.  Colonoscopy to from 2018 with 5-year follow-up recommended.  Prostate cancer: Continue utilization of PSA and digital rectal exam for screening    Cholesterol:   LDL Calculated (mg/dL)   Date Value   12/17/2018 138 (H)   11/02/2017 152 (H)   10/17/2014 142 (H)      Blood Pressure:   BP Readings from Last 3 Encounters:   12/20/19 124/72   08/23/19 118/72   02/13/19 120/60     Immunization History   Administered Date(s) Administered     Influenza, inj, historic,unspecified 09/01/2016, 09/26/2017     Influenza, seasonal,quad inj 6-35 mos 09/27/2017     Influenza,inj,MDCK,PF,Quad >4yrs 10/29/2019     Influenza,seasonal quad, PF, =/> 6months 10/29/2018     Td,adult,historic,unspecified 01/03/1994, 01/10/2007     Tdap 09/14/2012     There are no  "preventive care reminders to display for this patient.     Physical Exam      /72   Pulse 66   Ht 5' 8\" (1.727 m)   Wt 191 lb (86.6 kg)   SpO2 97%   BMI 29.04 kg/m          General Appearance:    Alert, cooperative, no distress   Eyes:   No scleral icterus or conjunctival irritation       Ears:    Normal TM's and external ear canals, both ears   Throat:   Lips, mucosa, and tongue normal; teeth and gums normal   Neck:   Supple, symmetrical, trachea midline, no adenopathy;        thyroid:  No enlargement/tenderness/nodules   Lungs:     Clear to auscultation bilaterally, respirations unlabored, no wheezes or crackles   Heart:    Regular rate and rhythm,  No murmur   Abdomen:    Soft, no distention, no tenderness on palpation, no masses, no organomegaly     Extremities:  No edema, no joint swelling or redness, no evidence of any injuries   Skin:  No concerning skin findings, no suspicious moles, no rashes   Neurologic:  On gross examination there is no motor or sensory deficit.  Patient walks with a normal gait     Genitalia:   Normal testicular anatomy, no inguinal hernias, no skin findings in the genital region   Rectal:    Normal tone, no hemorrhoids masses or other anal rectal findings, prostate has a smooth uniform consistency without nodules                                   "

## 2021-06-05 VITALS
WEIGHT: 188.5 LBS | BODY MASS INDEX: 28.57 KG/M2 | OXYGEN SATURATION: 99 % | SYSTOLIC BLOOD PRESSURE: 128 MMHG | HEIGHT: 68 IN | DIASTOLIC BLOOD PRESSURE: 74 MMHG | HEART RATE: 65 BPM

## 2021-06-06 NOTE — TELEPHONE ENCOUNTER
I did not call patient.  It appears he has an appointment scheduled for a nurse visit tomorrow, perhaps this is the reason for the call?

## 2021-06-06 NOTE — TELEPHONE ENCOUNTER
Medication Request  Medication name:    Disp  Refills  Start  End     sucralfate (CARAFATE) 1 gram tablet         Sig - Route: Take 1 g by mouth daily .        - Oral     Class: Historical Med         Requested Pharmacy: [facility]  Reason for request: Refill requested for above medication, medication is listed as historical.   \  When did you use medication last?:  Last fill unknown  Patient offered appointment:  N/A - electronic request  Okay to leave a detailed message: yes

## 2021-06-06 NOTE — TELEPHONE ENCOUNTER
Patient Returning Call  Reason for call:  Unknown  Information relayed to patient:  Calling back. No message to relay in encounters.   Patient has additional questions:  Yes  If YES, what are your questions/concerns:  Please call again  Okay to leave a detailed message?: Yes [phone]

## 2021-06-06 NOTE — TELEPHONE ENCOUNTER
Upcoming Appointment Question  When is the appointment: Today  What is your appointment for?: 2nd shingles vaccine  Who is your appointment scheduled with?: Nurse Visit  What is your question/concern?: Pt making sure he still has appointment.  Pt is not currently ill, or having any symptoms.  CMTs do not do travel screens.  Writer advised Pt to still come into his visit, as note did not state to cancel Pt.  Pt states he will head over at 1545.  Okay to leave a detailed message?: No

## 2021-06-08 NOTE — TELEPHONE ENCOUNTER
Dr Coleman pt, Josafat was told to have his PSA rechecked.  No orders in chart. Blood was drawn and held.  Please place orders.

## 2021-06-09 NOTE — PROGRESS NOTES
Patient ID: Bi Bae is a 56 y.o. male.  Visit Vitals     /76     Pulse (!) 54     Wt 184 lb 11.2 oz (83.8 kg)     SpO2 98%     BMI 28.08 kg/m2       Assessment/Plan:                   Diagnoses and all orders for this visit:    Epigastric abdominal pain  -     Comprehensive Metabolic Panel  -     HM1(CBC and Differential)  -     Lipase  -     HM1 (CBC with Diff)    HSV infection  -     acyclovir (ZOVIRAX) 800 MG tablet; TAKE 1 TABLET BY MOUTH FOUR TIMES DAILY.  Dispense: 20 tablet; Refill: 3          DISCUSSION  Unclear etiology for his pain, possibilities include gallbladder pathology, liver pathology, pancreas pathology, gastric pathology, intestinal pathology, possibly musculoskeletal but seems less likely as the pain is not reproducible with palpation.  We will obtain laboratory tests as noted above.  We will then decide on further course of action which may include ultrasound, CT scanning or other.  If he can change he agrees to seek appropriate evaluation or to contact me for advice.  Subjective:     HPI    Bi Bae is a 56-year-old man who has had epigastric abdominal discomfort for some time.  He recently underwent an endoscopy which showed normal esophagus.  He states the pain remains in the epigastric region slightly lateral to the midline on the right-hand side.  After he eats the pain seems to get worse.  He does note that he change in position namely if he puts his arms across his body and gives himself a hug it seems to help.  He does not have any significant change in his appetite nor has he had weight loss.  He denies any breathing difficulties, taking a deep breath does not cause increased pain.  He exercises on a regular basis and has no difficulties.  He is quite physically active and in very good shape.  He notes no hematemesis hematochezia melena diarrhea constipation or other digestive system issues.  The pain is causing more difficulty over time.    Review of  Systems  Complete review of systems is obtained.  Other than the specific considerations noted above complete review of systems is negative.        Objective:   Medications:  Current Outpatient Prescriptions   Medication Sig Note     acyclovir (ZOVIRAX) 800 MG tablet TAKE 1 TABLET BY MOUTH FOUR TIMES DAILY.      losartan (COZAAR) 25 MG tablet Take 1 tablet (25 mg total) by mouth daily.      pantoprazole (PROTONIX) 40 MG tablet Take 1 tablet (40 mg total) by mouth daily.      fluticasone (FLONASE) 50 mcg/actuation nasal spray 1 spray into each nostril daily.      lisinopril (PRINIVIL,ZESTRIL) 5 MG tablet  3/14/2017: Received from: External Pharmacy     omeprazole (PRILOSEC) 40 MG capsule TAKE 1 CAPSULE DAILY      sildenafil (VIAGRA) 100 MG tablet Take 1 tablet (100 mg total) by mouth daily as needed for erectile dysfunction.      Allergies:  No Known Allergies    Tobacco:   reports that he has never smoked. He does not have any smokeless tobacco history on file.     Physical Exam      Visit Vitals     /76     Pulse (!) 54     Wt 184 lb 11.2 oz (83.8 kg)     SpO2 98%     BMI 28.08 kg/m2         General Appearance:    Alert, cooperative, no distress   Eyes:    No conjunctival irritation, no scleral icterus       Lungs:     Clear to auscultation bilaterally, respirations unlabored   Heart:    Regular rate and rhythm, S1 and S2 normal, no murmur, rub   or gallop   Abdomen:     Soft, non-tender, bowel sounds active all four quadrants,     no masses, no organomegaly   Extremities:   Extremities normal, atraumatic, no cyanosis or edema   Skin:   Skin color, texture, turgor normal, no rashes or lesions   Neurologic:   Normal strength and sensation

## 2021-06-09 NOTE — TELEPHONE ENCOUNTER
RN cannot approve Refill Request    RN can NOT refill this medication med is not covered by policy/route to provider. Last office visit: 8/23/2019 Neftali Coleman MD Last Physical: 12/20/2019 Last MTM visit: Visit date not found Last visit same specialty: 8/23/2019 Neftali Coleman MD.  Next visit within 3 mo: Visit date not found  Next physical within 3 mo: Visit date not found    Script last filled 2 years ago.  Sig is incorrect.    Jennifer David, [facility] Triage/Med Refill 7/9/2020    Requested Prescriptions   Pending Prescriptions Disp Refills     sildenafil (REVATIO) 20 mg tablet [Pharmacy Med Name: Sildenafil Citrate Oral Tablet 20 MG] 30 tablet 0     Sig: TAKE 1 TABLET BY MOUTH 3 TIMES DAILY       Medications for Impotence Refill Protocol Passed - 7/7/2020  8:57 AM        Passed - PCP or prescribing provider visit in last year     Last office visit with prescriber/PCP: 8/23/2019 Neftali Coleman MD OR same dept: 8/23/2019 Neftali Coleman MD OR same specialty: 8/23/2019 Neftali Coleman MD  Last physical: 12/20/2019 Last MTM visit: Visit date not found   Next visit within 3 mo: Visit date not found  Next physical within 3 mo: Visit date not found  Prescriber OR PCP: Neftali Coleman MD  Last diagnosis associated with med order: 1. ED (erectile dysfunction)  - sildenafil (REVATIO) 20 mg tablet [Pharmacy Med Name: Sildenafil Citrate Oral Tablet 20 MG]; TAKE 1 TABLET BY MOUTH 3 TIMES DAILY  Dispense: 30 tablet; Refill: 0    If protocol passes may refill for 12 months if within 3 months of last provider visit (or a total of 15 months).

## 2021-06-10 NOTE — PROGRESS NOTES
[facility] Consultation Note    Thank you, Dr. Coleman, for asking the [facility] team to see Bi SANTOS Catalino in consultation today at our clinic to evaluate chest pain.      Assessment:   1.  Occult chest pain not cardiac related  2.  Essential hypertension     Plan:   No further cardiac evaluation at this time.  I did discuss the utility of obtaining a calcium score at a cost of $150 not generally covered by insurance            Current History:   56-year-old male who has a 10 year history of chronic nonexertional chest pain.  The patient on Wednesday at around 3 in the afternoon developed a cramping type pain in the epigastric area that lasted for 2 minutes different from his chronic chest pain.  The pain resolved on its own.  He became concerned and went to the emergency room late that evening for evaluation where a normal ECG and chest CT scan were obtained.  No coronary artery calcification was mentioned on his chest CT scan report.      Patient again has had chronic chest pain for 10 years and had a normal stress echo at 15 minutes on a Jacinto protocol in .  He exercises on a regular basis working out at 430 minutes on a stationary bike with no chest pain occurring with exercise    Past Medical History:   No past medical history on file.  Essential hypertension is his only medical problems.  He states he has borderline hyperlipidemia currently not being treated     Past Surgical History:   No past surgical history on file.  Right ACL repair    Family History:   No family history on file.  Father  at age 88 of congestive heart failure.  Mother  from complications of Alzheimer's at age 80.  He has 3 brothers and of whom have known coronary artery disease    Social History:    reports that he has never smoked. He does not have any smokeless tobacco history on file.  Patient is  and employed    Meds:   Scheduled Meds:  PRN Meds:.    Allergies:   Review of patient's  "allergies indicates no known allergies.    Review of Systems:   General: WNL  Eyes: WNL  Ears/Nose/Throat: WNL  Lungs: WNL  Heart: WNL  Stomach: WNL  Bladder: WNL  Muscle/Joints: WNL  Skin: WNL  Nervous System: WNL  Mental Health: WNL     Blood: WNL      Objective:      Physical Exam  179 lb (81.2 kg)  5' 8\" (1.727 m)  Body mass index is 27.22 kg/(m^2).  /82 (Patient Site: Right Arm, Patient Position: Sitting, Cuff Size: Adult Regular)  Pulse 66  Resp 16  Ht 5' 8\" (1.727 m)  Wt 179 lb (81.2 kg)  BMI 27.22 kg/m2    General Appearance:   alert, no apparent distress   HEENT:  no scleral icterus; the mucous membranes were pink and moist                                  Neck: jugular venous pressure is normal, no thyromegaly   Chest: the spine was straight and the chest was symmetric   Lungs:   respirations unlabored; the lungs are clear to auscultation   Cardiovascular:   regular rhythm with normal first and second heart sounds and no murmurs, clicks, or gallops. Carotid, radial, femoral, and posterior tibial pulses are intact; there are no carotid or femoral bruits.   Abdomen:  no organomegaly, masses, bruits, or tenderness; bowel sounds are present   Extremities: no cyanosis, clubbing, or edema   Skin: no xanthelasma   Neurologic: mood and affect are appropriate             Lab Review   Lab Results   Component Value Date     04/27/2017    K 4.1 04/27/2017     (H) 04/27/2017    CO2 22 04/27/2017    BUN 13 04/27/2017    CREATININE 0.92 04/27/2017    CALCIUM 9.1 04/27/2017     Lab Results   Component Value Date    WBC 6.3 04/27/2017    HGB 14.5 04/27/2017    HCT 40.2 04/27/2017    MCV 91 04/27/2017     04/27/2017     Lab Results   Component Value Date    CHOL 225 (H) 10/17/2014    TRIG 165 (H) 10/17/2014    HDL 50 10/17/2014         Kel Lamar M.D., F.A.C.CNovant [facility]  [phone]       "

## 2021-06-11 NOTE — TELEPHONE ENCOUNTER
Refill Approved    Rx renewed per Medication Renewal Policy. Medication was last renewed on 7/10/20.    Karen Esqueda, [facility] Triage/Med Refill 9/12/2020     Requested Prescriptions   Pending Prescriptions Disp Refills     sildenafil (REVATIO) 20 mg tablet 30 tablet 0     Sig: Take 1 tablet (20 mg total) by mouth 3 (three) times a day.       Medications for Impotence Refill Protocol Passed - 9/10/2020  2:10 PM        Passed - PCP or prescribing provider visit in last year     Last office visit with prescriber/PCP: 8/23/2019 Neftali Coleman MD OR same dept: Visit date not found OR same specialty: 8/23/2019 Neftali Coleman MD  Last physical: 12/20/2019 Last MTM visit: Visit date not found   Next visit within 3 mo: Visit date not found  Next physical within 3 mo: Visit date not found  Prescriber OR PCP: Neftali Coleman MD  Last diagnosis associated with med order: 1. ED (erectile dysfunction)  - sildenafil (REVATIO) 20 mg tablet; Take 1 tablet (20 mg total) by mouth 3 (three) times a day.  Dispense: 30 tablet; Refill: 0    If protocol passes may refill for 12 months if within 3 months of last provider visit (or a total of 15 months).

## 2021-06-13 NOTE — TELEPHONE ENCOUNTER
Refill Approved    Rx renewed per Medication Renewal Policy. Medication was last renewed on 12/14/18, last OV 12/20/19.    Caitlin Presley, [facility] Triage/Med Refill 11/21/2020     Requested Prescriptions   Pending Prescriptions Disp Refills     pantoprazole (PROTONIX) 40 MG tablet [Pharmacy Med Name: PANTOPRAZOLE TAB 40MG DR] 90 tablet 3     Sig: TAKE 1 TABLET DAILY. DUE   FOR OFFICE VISIT PRIOR TO  FURTHER REFILLS.       GI Medications Refill Protocol Passed - 11/20/2020  7:08 AM        Passed - PCP or prescribing provider visit in last 12 or next 3 months.     Last office visit with prescriber/PCP: 8/23/2019 Neftali Coleman MD OR same dept: Visit date not found OR same specialty: 8/23/2019 Neftali Coleman MD  Last physical: 12/20/2019 Last MTM visit: Visit date not found   Next visit within 3 mo: Visit date not found  Next physical within 3 mo: Visit date not found  Prescriber OR PCP: Neftali Coleman MD  Last diagnosis associated with med order: 1. Gastroesophageal reflux disease without esophagitis  - pantoprazole (PROTONIX) 40 MG tablet [Pharmacy Med Name: PANTOPRAZOLE TAB 40MG DR]; TAKE 1 TABLET DAILY. DUE   FOR OFFICE VISIT PRIOR TO  FURTHER REFILLS.  Dispense: 90 tablet; Refill: 3    If protocol passes may refill for 12 months if within 3 months of last provider visit (or a total of 15 months).

## 2021-06-13 NOTE — PROGRESS NOTES
" Patient ID: Bi Bae is a 57 y.o. male.  /68  Pulse 74  Ht 5' 9\" (1.753 m)  Wt 182 lb (82.6 kg)  SpO2 99%  BMI 26.88 kg/m2    Assessment/Plan:                Diagnoses and all orders for this visit:    Routine general medical examination at a health care facility    Screen for colon cancer  -     Cancel: Ambulatory referral for Colonoscopy    Chronic cough  -     Ambulatory referral to Pulmonology    Other orders  -     sildenafil (REVATIO) 20 mg tablet; Take 1 tablet (20 mg total) by mouth 3 (three) times a day.  Dispense: 30 tablet; Refill: 6  -     tamsulosin (FLOMAX) 0.4 mg Cp24; Take 1 capsule (0.4 mg total) by mouth at bedtime.  Dispense: 30 capsule; Refill: 6    DISCUSSION  He will return for fasting labs.  Prescription is provided for sildenafil for erectile dysfunction.  We will undergo a trial of tamsulosin for benign prostatic hypertrophy likely contributing to his obstructive urinary symptoms.  Will refer to pulmonology for further workup of his chronic cough.  Subjective:     HPI    Bi Bae is a 57-year-old man who is here today for a physical.  He is rectal dysfunction would like to obtain generic sildenafil in place of name brand Viagra.  Patient reports more frequent urination and sensation of incomplete bladder emptying at times.  Patient has had a chronic cough without any discernible cause.  He is currently on lisinopril but his chronic cough had occurred even well before he was ever placed on lisinopril.  He is undergone workup as well as treatment course that has included treatment for acid reflux and potential postnasal drip.  These treatment courses have not resulted in an improvement in his cough.  He is come to tolerate his cough but his family is very bothered by it.  It tends to be a dry persistent cough.  Patient does report some shoulder pain and minor skin issues which were discussed today.  We reviewed other routine health preventive measures.  No other " significant concerns are identified.    Review of Systems  Complete review of systems is obtained.  Other than the specific considerations noted above complete review of systems is negative.      Objective:   Medications:  Current Outpatient Prescriptions   Medication Sig Note     acyclovir (ZOVIRAX) 800 MG tablet TAKE 1 TABLET BY MOUTH FOUR TIMES DAILY.      lisinopril (PRINIVIL,ZESTRIL) 5 MG tablet  10/27/2017: Received from: [facility]     pantoprazole (PROTONIX) 40 MG tablet  10/27/2017: Received from: [facility]     sildenafil (VIAGRA) 100 MG tablet Take 1 tablet (100 mg total) by mouth daily as needed for erectile dysfunction.      sucralfate (CARAFATE) 1 gram tablet Take 1 g by mouth 4 (four) times a day.      fluticasone (FLONASE) 50 mcg/actuation nasal spray 1 spray into each nostril daily.      sildenafil (REVATIO) 20 mg tablet Take 1 tablet (20 mg total) by mouth 3 (three) times a day.      tamsulosin (FLOMAX) 0.4 mg Cp24 Take 1 capsule (0.4 mg total) by mouth at bedtime.      Allergies:  No Known Allergies    Tobacco:   reports that he has never smoked. He does not have any smokeless tobacco history on file.    HEALTH PREVENTION    General  Dental care: Discussed the importance of regular dental care.  Eye care: Discussed importance of routine eye exams for glaucoma screening  Exercise: Discussed the importance of regular exercise.  Diet: Reviewed aspects of a healthy balanced diet.    Wt Readings from Last 3 Encounters:   10/27/17 182 lb (82.6 kg)   04/28/17 179 lb (81.2 kg)   04/27/17 180 lb (81.6 kg)     Body mass index is 26.88 kg/(m^2).      Cancer screening  Testicular cancer:is discussed and exam performed today  Skin cancer: Discussed sun burn prevention and self monitoring.  Colon cancer: Colon cancer screening is discussed.  Colonoscopy report from 2012 is reviewed noting that he is doing 10 years in 2022.  Prostate cancer:  "Discussed continued utilization of PSA and digital rectal exam for screening.    Cholesterol:   LDL Calculated (mg/dL)   Date Value   11/02/2017 152 (H)   10/17/2014 142 (H)   09/18/2012 152 (H)      Blood Pressure:   BP Readings from Last 3 Encounters:   10/27/17 126/68   04/28/17 120/82   04/27/17 114/78       Immunization History   Administered Date(s) Administered     Influenza, inj, historic 09/01/2016, 09/26/2017     Td, historic 01/03/1994, 01/10/2007     Tdap 09/14/2012     There are no preventive care reminders to display for this patient.     Physical Exam      /68  Pulse 74  Ht 5' 9\" (1.753 m)  Wt 182 lb (82.6 kg)  SpO2 99%  BMI 26.88 kg/m2    General Appearance:    Alert, cooperative, no distress, appears stated age   Head:    Normocephalic, without obvious abnormality, atraumatic   Eyes:    PERRL, conjunctiva/corneas clear, EOM's intact       Ears:    Normal TM's and external ear canals, both ears   Nose:   Nares normal, septum midline, mucosa normal, no drainage    or sinus tenderness   Throat:   Lips, mucosa, and tongue normal; teeth and gums normal   Neck:   Supple, symmetrical, trachea midline, no adenopathy;        thyroid:  No enlargement/tenderness/nodules   Lungs:     Clear to auscultation bilaterally, respirations unlabored   Chest wall/ Breast:    No tenderness or deformity   Heart:    Regular rate and rhythm, S1 and S2 normal, no murmur, rub   or gallop   Abdomen:     Soft, non-tender, bowel sounds active all four quadrants,     no masses, no organomegaly   Genitalia:   Normal testicular anatomy no inguinal hernias   Rectal:   Smooth uniform consistency of the prostate no nodules   Extremities:   Extremities normal, atraumatic, no cyanosis or edema   Pulses:   2+ and symmetric all extremities   Skin:   Skin color, texture, turgor normal, no rashes or lesions   Neurologic:   CNII-XII intact. Normal strength, sensation and reflexes       throughout                         "

## 2021-06-14 NOTE — PROGRESS NOTES
Pulmonary Clinic Consult Note  Date of Service: 12/13/2017    Reason for Consultation: chronic cough.    History:     HPI: Patient is a pleasant 57-year-old male, lifelong non-smoker, with past medical history significant for GERD, history of sinusitis, and hypertension who was referred here for evaluation of chronic cough.  His cough has been present for about 10 years. It is usually productive of white/clear phlegm.  Cough is worse later in the day. His cough does not wake him up from sleep.  For his chronic cough, he has been treated by his primary care physician for GERD and postnasal drip.  He notes that he has GERD if he does not take PPI.  He had been treated with PPI for about 3 years.  There was no difference.  He is currently on pantoprazole.  He notes that sometimes he clears his throat. He had been on flonase but he only tried for a week this past spring. There was no improvement.  He used to have h/o sinusitis in the past but not recently. He denies any previous allergies. He notes that he sometimes wheezes but denies history of asthma. He works out regularly, by push-up, pull-up, and rides a stationary bike for about 4 days week. He does note feel limited.    Pt is on lisinopril for HTN. He had been on it for about 3 years at least.  It has not been stopped at any time but notes that he had coughed previous to that and his cough has been for about 5-10 years.     PMHx/PSHx:  GERD  HTN  H/o sinusitis  Right ACL repair    Social Hx:  Lifelong non-smoker  Works as a   Lives in a house  No birds      Family Hx:   Father had CHF.  Mother had Alzheimer's dementia. No history of lung cancer.     Review of Systems - 10 point review of system negative except for what is mentioned in the HPI.  Meds and Allergies:  See EHR for the updated medication list and Allergies. These were reviewed.     Exam/Data:   /78  Pulse 85  Resp 16  Wt 183 lb (83 kg)  SpO2 97% Comment: RA at rest   BMI 27.02 kg/m2    EXAM:  GEN: comfortable, NAD  HEENT: NCAT, EMOI, mmm  LN: no cervical LAD   CVS: S1S2, RRR  Lung: good air entry bilaterally, no wheezing  Abd: soft, nt, + BS. No masses  Ext: no c/c/e  Vasc: intact radial pulses bilaterally  Neuro: nonfocal  Skin: no visible rash  Musculoskeletal: FROM all extremities  Psych: normal affect    DATA      PFT DATA 11/27/2017:  FEV1/FVC is 74 and is normal.  FEV1 is 111% predicted and is normal.  FVC is 118% predicted and normal.  There was no improvement in spirometry after a single inhaled dose of bronchodilator.  TLC is 105% predicted and is normal.  RV is 110% predicted and is normal.  DLCO is 133% predicted and is normal when it is corrected for hemoglobin.     Impression:  Full Pulmonary Function Test is normal    CTA 4/2017:  CONCLUSION:  1.  No CT evidence of pulmonary emboli.  2.  No aneurysm or dissection involving the thoracic aorta.  3.  No infiltrates.  4.  Hepatic cysts.    Assessment/Plan:   Bi Bae is a 57 y.o. male, lifelong non-smoker, with history of chronic cough for about 5-10 years was referred here for evaluation.  CT scan of the chest is unremarkable.  It was done back on April 2017.  PFTs as noted above are completely normal.  He is on ACE inhibitor however his cough started prior to initiation of ACE inhibitor.  Has GERD and is being treated with PPI.  He may also have postnasal drip that he only tried Flonase for 1 week.    Recommendations:  Would strongly recommend stopping lisinopril even if his cough started prior to initiation of ACE inhibitor.  Would recommend an alternative antihypertensive agent.  Will defer this to primary care physician.  This was discussed with patient.  Methacholine challenge test next visit to rule out cough variant asthma  Avoid eating late a night, spicy foods, alcohol, and chocolates, etc given that he has GERD  Start flonase and instructed to continue taking it for 3-4 months  Continue PPI    FOLLOW  UP: 4 months    Leanne Ndiaye MD  Pulmonary and Critical Care Medicine  12/13/2017        No Known Allergies    Medications:     Current Outpatient Prescriptions   Medication Sig Dispense Refill     acyclovir (ZOVIRAX) 800 MG tablet TAKE 1 TABLET BY MOUTH FOUR TIMES DAILY. 20 tablet 3     lisinopril (PRINIVIL,ZESTRIL) 5 MG tablet TAKE 1 TABLET DAILY (NEED OFFICE VISIT) 90 tablet 3     pantoprazole (PROTONIX) 40 MG tablet        sildenafil (REVATIO) 20 mg tablet Take 1 tablet (20 mg total) by mouth 3 (three) times a day. 30 tablet 6     sucralfate (CARAFATE) 1 gram tablet Take 1 g by mouth 4 (four) times a day.       tamsulosin (FLOMAX) 0.4 mg Cp24 Take 1 capsule (0.4 mg total) by mouth at bedtime. 30 capsule 6     No current facility-administered medications for this visit.

## 2021-06-14 NOTE — PROGRESS NOTES
" Patient ID: Bi Bae is a 60 y.o. male.  /74   Pulse 65   Ht 5' 8\" (1.727 m)   Wt 188 lb 8 oz (85.5 kg)   SpO2 99%   BMI 28.66 kg/m      Assessment/Plan:                   Diagnoses and all orders for this visit:    Routine general medical examination at a health care facility    Gastroesophageal reflux disease without esophagitis  -     Ambulatory referral for Upper GI Endoscopy    Elevated prostate specific antigen (PSA)  -     Ambulatory referral to Urology    Other orders  -     Cancel: PSA, Annual Screen (Prostatic-Specific Antigen)  -     Cancel: Comprehensive Metabolic Panel  -     Cancel: Lipid Cascade           DISCUSSION  Refer to gastroenterology for endoscopy.  Temporarily increase Protonix to 40 mg twice daily continue with sucralfate continue with dietary modification.    For elevated PSA test referral to urology for further evaluation, exam deferred today since he will be seeing the specialist.  Subjective:     HPI    Bi Bae is a 60-year-old man here today for a physical.  Previous history of hypertension, off of medication with lifestyle adjustments.  Blood pressure is quite good.  Has hyperlipidemia with recent improvement noted with lifestyle modification.  Has history of HSV infections no recent concern.    He has a history of acid reflux.  In 2017 had endoscopy and consultation with [place] [facility].  He had done well with moderating diet and utilizing Protonix at 40 mg daily in conjunction with sucralfate.  Around [place] time now just about 4 to 5 weeks ago he had onset of symptoms which have been persistent without any explainable cause.  Despite refocusing efforts on diet and continuing medication his symptoms have persisted.  Denies dysphagia hematemesis hematochezia and melena.  We could not identify any specific risk factor which would have incited this.  We did identify some specific considerations to continue to work on in terms of diet.  We " discussed obtaining endoscopy.    His PSA test was obtained prior to his visit and is elevated.  He had had a baseline that slowly increased from 0.5 in 2012 up to 1.1 in 2018.  In 2019 he had a slight jump to 2.5.  This still fell within the normal range so we elected to repeat the test and it came down to 1.8 in May 2020.  His PSA now in January 2021 is 3.8.  We discussed considerations related to prostate cancer screening and the PSA test.  We discussed that this does not diagnose cancer but certainly creates a concern we may need to be investigating this further.  We discussed options for continued monitoring with PSA testing to better determine a trajectory versus specialty evaluation.  Through shared decision making we elected to proceed with a specialty evaluation at this point in time.    Discussed other aspects of routine health and prevention.      Review of Systems  Complete review of systems is obtained.  Other than the specific considerations noted above complete review of systems is negative.          Objective:   Medications:  Current Outpatient Medications   Medication Sig     acyclovir (ZOVIRAX) 800 MG tablet TAKE 1 TABLET BY MOUTH FOUR TIMES DAILY..     ascorbic acid, vitamin C, (VITAMIN C) 1000 MG tablet Take 1,000 mg by mouth daily.     cetirizine (ZYRTEC) 10 MG tablet Take 10 mg by mouth 2 (two) times a day.     fluticasone propionate (FLONASE) 50 mcg/actuation nasal spray 1 spray into each nostril daily.     multivitamin therapeutic tablet Take 1 tablet by mouth daily.     pantoprazole (PROTONIX) 40 MG tablet TAKE 1 TABLET DAILY. DUE   FOR OFFICE VISIT PRIOR TO  FURTHER REFILLS.     sildenafiL (VIAGRA) 100 MG tablet Take 1 tablet (100 mg total) by mouth as needed for erectile dysfunction.     sucralfate (CARAFATE) 1 gram tablet TAKE 1 TABLET DAILY     zinc sulfate (ZINC-15 ORAL) Take by mouth.       Allergies:  No Known Allergies    Tobacco:   reports that he has never smoked. He has quit using  "smokeless tobacco.    HEALTH PREVENTION    General  Dental care: Discussed the importance of regular dental care.  Eye care: Discussed importance of routine eye exams for glaucoma screening  Exercise: Discussed the importance of maintaining a regular exercise regimen.  Diet: Discussed a healthy balanced diet.    Wt Readings from Last 3 Encounters:   01/25/21 188 lb 8 oz (85.5 kg)   12/20/19 191 lb (86.6 kg)   08/23/19 186 lb (84.4 kg)     Body mass index is 28.66 kg/m .    The following high BMI interventions were performed this visit: encouragement to exercise    Cancer screening  Testicular cancer:is discussed and exam performed today  Skin cancer: Discussed sun burn prevention and self monitoring.  Colon cancer: Colon cancer screening is discussed.  Reviewed colonoscopy.  Prostate cancer: See discussion above.      Cholesterol:   Y  LDL Calculated (mg/dL)   Date Value   01/19/2021 131 (H)   12/20/2019 145 (H)   12/17/2018 138 (H)      Blood Pressure:   BP Readings from Last 3 Encounters:   01/25/21 128/74   12/20/19 124/72   08/23/19 118/72       Immunization History   Administered Date(s) Administered     INFLUENZA,SEASONAL QUAD, PF, =/> 6months 10/29/2018     Influenza, inj, historic,unspecified 09/01/2016, 09/26/2017, 10/27/2020     Influenza, seasonal,quad inj 6-35 mos 09/27/2017     Influenza,inj,MDCK,PF,Quad >4yrs 10/29/2019     Td,adult,historic,unspecified 01/03/1994, 01/10/2007     Tdap 09/14/2012     ZOSTER, RECOMBINANT, IM 12/23/2019, 03/18/2020     There are no preventive care reminders to display for this patient.     Physical Exam          /74   Pulse 65   Ht 5' 8\" (1.727 m)   Wt 188 lb 8 oz (85.5 kg)   SpO2 99%   BMI 28.66 kg/m          General Appearance:    Alert, cooperative, no distress   Eyes:   No scleral icterus or conjunctival irritation       Ears:    Normal TM's and external ear canals, both ears   Throat:   Lips, mucosa, and tongue normal; teeth and gums normal   Neck:   " Supple, symmetrical, trachea midline, no adenopathy;        thyroid:  No enlargement/tenderness/nodules   Lungs:     Clear to auscultation bilaterally, respirations unlabored, no wheezes or crackles   Heart:    Regular rate and rhythm,  No murmur   Abdomen:    Soft, no distention, no tenderness on palpation, no masses, no organomegaly     Extremities:  No edema, no joint swelling or redness, no evidence of any injuries   Skin:  No concerning skin findings, no suspicious moles, no rashes   Neurologic:  On gross examination there is no motor or sensory deficit.  Patient walks with a normal gait

## 2021-06-14 NOTE — TELEPHONE ENCOUNTER
RN cannot approve Refill Request    RN can NOT refill this medication PCP messaged that patient is overdue for Office Visit. Last office visit: Visit date not found Last Physical: Visit date not found Last MTM visit: Visit date not found Last visit same specialty: 8/23/2019 Neftali Coleman MD.  Next visit within 3 mo: Visit date not found  Next physical within 3 mo: Visit date not found      Caitlin Presley, [facility] Triage/Med Refill 1/16/2021    Requested Prescriptions   Pending Prescriptions Disp Refills     sucralfate (CARAFATE) 1 gram tablet [Pharmacy Med Name: SUCRALFATE TAB 1GM] 90 tablet 3     Sig: TAKE 1 TABLET DAILY       GI Medications Refill Protocol Failed - 1/15/2021  8:41 AM        Failed - PCP or prescribing provider visit in last 12 or next 3 months.     Last office visit with prescriber/PCP: Visit date not found OR same dept: Visit date not found OR same specialty: 8/23/2019 Neftali Coleman MD  Last physical: Visit date not found Last MTM visit: Visit date not found   Next visit within 3 mo: Visit date not found  Next physical within 3 mo: Visit date not found  Prescriber OR PCP: Per Russo MD  Last diagnosis associated with med order: 1. Gastroesophageal reflux disease without esophagitis  - sucralfate (CARAFATE) 1 gram tablet [Pharmacy Med Name: SUCRALFATE TAB 1GM]; TAKE 1 TABLET DAILY  Dispense: 90 tablet; Refill: 3    If protocol passes may refill for 12 months if within 3 months of last provider visit (or a total of 15 months).

## 2021-06-14 NOTE — PROGRESS NOTES
PFT NOTE    Pt gave good effort & was cooperative, was able to meet ATS standards for 3 acceptable maneuvers, albuterol was given for the bronchodilator. Patient left in no distress.    Ana Chatman, LRT      RESPIRATORY CARE NOTE     Patient Name: Bi Bae  Today's Date: 11/27/2017     Problem List  Patient Active Problem List   Diagnosis     Burning Sensation (Dysesthesia)     Esophageal Reflux     HTN (hypertension)     Sinusitis                           Ana Chatman

## 2021-06-15 NOTE — TELEPHONE ENCOUNTER
Refill Approved    Rx renewed per Medication Renewal Policy. Medication was last renewed on 11/21/2020.  Last office visit was 01/25/2021 with PCP.    Jennifer David, [facility] Triage/Med Refill 2/21/2021     Requested Prescriptions   Pending Prescriptions Disp Refills     pantoprazole (PROTONIX) 40 MG tablet [Pharmacy Med Name: PANTOPRAZOLE TAB 40MG DR] 90 tablet 0     Sig: TAKE 1 TABLET DAILY. DUE   FOR OFFICE VISIT PRIOR TO  FURTHER REFILLS.       GI Medications Refill Protocol Passed - 2/21/2021  7:07 PM        Passed - PCP or prescribing provider visit in last 12 or next 3 months.     Last office visit with prescriber/PCP: 8/23/2019 Neftali Coleman MD OR same dept: Visit date not found OR same specialty: 8/23/2019 Neftali Coleman MD  Last physical: 1/25/2021 Last MTM visit: Visit date not found   Next visit within 3 mo: Visit date not found  Next physical within 3 mo: Visit date not found  Prescriber OR PCP: Neftali Coleman MD  Last diagnosis associated with med order: 1. Gastroesophageal reflux disease without esophagitis  - pantoprazole (PROTONIX) 40 MG tablet [Pharmacy Med Name: PANTOPRAZOLE TAB 40MG DR]; TAKE 1 TABLET DAILY. DUE   FOR OFFICE VISIT PRIOR TO  FURTHER REFILLS.  Dispense: 90 tablet; Refill: 0    If protocol passes may refill for 12 months if within 3 months of last provider visit (or a total of 15 months).

## 2021-06-16 PROBLEM — L25.9 CONTACT DERMATITIS, UNSPECIFIED CONTACT DERMATITIS TYPE, UNSPECIFIED TRIGGER: Status: ACTIVE | Noted: 2019-02-13

## 2021-06-16 PROBLEM — R07.9 CHEST PAIN: Status: ACTIVE | Noted: 2017-03-06

## 2021-06-16 PROBLEM — L50.9 HIVES: Status: ACTIVE | Noted: 2019-02-13

## 2021-06-16 PROBLEM — K21.9 GASTROESOPHAGEAL REFLUX DISEASE WITHOUT ESOPHAGITIS: Status: ACTIVE | Noted: 2017-05-10

## 2021-06-16 PROBLEM — K62.5 HEMORRHAGE OF RECTUM AND ANUS: Status: ACTIVE | Noted: 2018-09-18

## 2021-06-16 NOTE — PROGRESS NOTES
CHIEF COMPLAINT:   Hearing problem        HISTORY OF PRESENT ILLNESS    Bi Bae   was seen at the behest of Finch, Neftali GUNTER MD  for tinnitus.    Marty reports tinnitus which sounds like crickets or CircAid's since the January of this year.  First in the right ear then developed in the left ear.  No associated dizziness or hearing or hearing loss.  He does have feels like he does have to have the TV TV up louder.  He does not feel like he is ready for hearing aids.  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  Does have a history of cold water swimming.                 REVIEW OF SYSTEMS    Review of Systems: a 10-system review is reviewed at this encounter.  See scanned document.         PHYSICAL EXAM:        HEAD: Normal appearance and symmetry:  No cutaneous lesions.      EARS:    Normal TM's bilaterally. Normal auditory canals and external ears. Non-tender  Small cysts at inferior/medial aspect of each EAC (exostoses).          NOSE:    Dorsum:   straight  Septum: deviated LEFT  Mucosa:  moist  Inferior turbinates:  normal       ORAL CAVITY/OROPHARYNX:    Lips:  Normal.  Tongue: normal, midline  Mucosa:   no lesions  Tonsils:  1+      NECK:  Trachea:  midline.   Thyroid:  normal   Adenopathy:  none       NEURO:   Alert and Oriented       RESPIRATORY:   Symmetry and Respiratory effort    PSYCH:   normal mood and affect    SKIN:  warm and dry         IMPRESSION:    Encounter Diagnoses   Name Primary?     Tinnitus, bilateral Yes     Exostosis of external ear canal, bilateral      Sensorineural hearing loss (SNHL) of both ears             RECOMMENDATIONS:    Consider hearing aids in future  Return annually for hearing tests  Masking strategies    Discussed masking strategies for tinnitus.  Also advised him that hearing aids would help suppress or possibly even eliminate his tinnitus tinnitus.  He will consider hearing aids but is not ready to  move forward at this point.  I would recommend return annually for hearing test.  Also discussed masking strategies.  All questions were answered he is agreeable to plan of care

## 2021-06-17 NOTE — PATIENT INSTRUCTIONS - HE
"Patient Instructions by Sonya Romo CNP at 2/13/2019  9:20 AM     Author: Sonya Romo CNP Service: -- Author Type: Nurse Practitioner    Filed: 2/13/2019  9:51 AM Encounter Date: 2/13/2019 Status: Signed    : Sonya Romo CNP (Nurse Practitioner)       Patient Education     Contact Dermatitis  Contact dermatitis is a skin rash caused by something that touches the skin and makes it irritated and inflamed. Your skin may be red, swollen, dry, and may be cracked. Blisters may form and ooze. The rash will itch.  Contact dermatitis can form on the face and neck, backs of hands, forearms, genitals, and lower legs.  People can get contact dermatitis from lots of sources. These include:    Plants such as poison ivy, oak, or sumac    Chemicals in hair dyes and rinses, soaps, solvents, waxes, fingernail polish, and deodorants     Jewelry or watchbands made of nickel  Contact dermatitis is not passed from person to person.  Talk with your healthcare provider about what may have caused the rash. A type of allergy testing called \"patch testing\" may be used to discover what you are allergic to. You will need to avoid the source of your rash in the future to prevent it from coming back.  Treatment is done to relieve itching and prevent the rash from coming back. The rash should go away in a few days to a few weeks.  Home care  Your healthcare provider may prescribe medicine to relieve swelling and itching. Follow all instructions when using these medicines.  General care:    Avoid anything that heats up your skin, such as hot showers or baths, or direct sunlight. This can make itching worse.    Apply cold compresses to soothe your sores to help relieve your symptoms. Do this for 30 minutes 3 to 4 times a day. You can make a cold compress by soaking a cloth in cold water. Squeeze out excess water. You can add colloidal oatmeal to the water to help reduce itching. For severe itching in a small area, apply an ice pack " wrapped in a thin towel. Do this for 20 minutes 3 to 4 times a day.    You can also try wet dressings. One way to do this is to wear a wet piece of clothing under a dry one. Wear a damp shirt under a dry shirt if your upper body is affected. This can relieve itching and prevent you from scratching the affected area.    You can also help relieve large areas of itching by taking a lukewarm bath with colloidal oatmeal added to the water.    Use hydrocortisone cream for redness and irritation, unless another medicine was prescribed. You can also use benzocaine anesthetic cream or spray. Calamine lotion can also relieve mild symptoms.    Use oral diphenhydramine to help reduce itching. You can buy this antihistamine at drug and grocery stores. It can make you sleepy, so use lower doses during the daytime. Or you can use loratadine. This is an antihistamine that will not make you sleepy. Do not use diphenhydramine if you have glaucoma or have trouble urinating due to an enlarged prostate.    If a plant causes your rash, make sure to wash your skin and the clothes you were wearing when you came into contact with the plant. This is to wash away the plant oils that gave you the rash and prevent more or worse symptoms.    Stay away from the substance or object that causes your symptoms. If you cant avoid it, wear gloves or some other type of protection.  Follow-up care  Follow up with your healthcare provider, or as advised.  When to seek medical advice  Call your healthcare provider right away if any of these occur:    Spreading of the rash to other parts of your body    Severe swelling of your face, eyelids, mouth, throat or tongue    Trouble urinating due to swelling in the genital area    Fever of 100.4 F (38 C) or higher    Redness or swelling that gets worse    Pain that gets worse    Foul-smelling fluid leaking from the skin    Yellow-brown crusts on the open blisters  Date Last Reviewed: 9/1/2016 2000-2017 [employer]. [address] [place] [address], [place] [address]. All rights reserved. This information is not intended as a substitute for professional medical care. Always follow your healthcare professional's instructions.

## 2021-06-22 NOTE — PROGRESS NOTES
" Patient ID: Bi Bae is a 58 y.o. male.  /76   Pulse 64   Resp 16   Ht 5' 8\" (1.727 m)   Wt 187 lb 8 oz (85 kg)   SpO2 97%   BMI 28.51 kg/m      Assessment/Plan:                   Diagnoses and all orders for this visit:    Essential hypertension  -     Comprehensive Metabolic Panel    Routine general medical examination at a health care facility  -     PSA, Annual Screen (Prostatic-Specific Antigen)  -     Lipid Cascade    HSV infection      DISCUSSION  Obtain labs to reassess overall vascular disease risk and decide on further course of action.  Subjective:     HPI    Bi Bae is a 58-year-old man here today for a physical.  Medical history includes hypertension.  Was previously on treatment.  Now normotensive.  Has become more active but no other obvious discernible fact to have lower blood pressure.  He has a history of chronic cough did see pulmonology.  Was recommended to stop lisinopril.  This seems to have improved cough along with more consistent use of fluticasone nasal spray.  He had also seen cardiology because of some atypical chest pain this past year.  Overall doing well.  No significant concerns were identified otherwise in his specialty appointments.  He did have a colonoscopy performed and had adenomatous polyps with 5-year follow-up recommended.  He is undergone PSA screening.  Does have a history of some BPH with some obstructive symptoms.  Tried to tamsulosin no significant symptom medic benefit elected not to pursue any treatment at this time.  Overall no new concerns.      Review of Systems  Complete review of systems is obtained.  Other than the specific considerations noted above complete review of systems is negative.          Objective:   Medications:  Current Outpatient Medications   Medication Sig     acyclovir (ZOVIRAX) 800 MG tablet TAKE 1 TABLET BY MOUTH FOUR TIMES DAILY.     pantoprazole (PROTONIX) 40 MG tablet TAKE 1 TABLET DAILY. DUE   FOR OFFICE VISIT " "PRIOR TO  FURTHER REFILLS.     sucralfate (CARAFATE) 1 gram tablet Take 1 g by mouth daily .           sildenafil, antihypertensive, (REVATIO) 20 mg tablet TAKE ONE TABLET THREE TIMES A DAY       Allergies:  No Known Allergies    Tobacco:   reports that  has never smoked. He has quit using smokeless tobacco.    HEALTH PREVENTION    General  Dental care: Discussed the importance of regular dental care.  Eye care: Discussed importance of routine eye exams for glaucoma screening  Exercise: Discussed the importance of continued regular exercise.  Diet: Discussed a healthy balanced diet.    Wt Readings from Last 3 Encounters:   12/17/18 187 lb 8 oz (85 kg)   12/13/17 183 lb (83 kg)   10/27/17 182 lb (82.6 kg)     Body mass index is 28.51 kg/m .    The following high BMI interventions were performed this visit: encouragement to exercise    Cancer screening  Testicular cancer:is discussed and exam performed today  Skin cancer: Discussed sun burn prevention and self monitoring.  Colon cancer: Colon cancer screening is discussed.  Reviewed most recent colonoscopy report noting he is due again in 5 years.  Prostate cancer: Discussed continued utilization of PSA test for screening.    Cholesterol:   LDL Calculated (mg/dL)   Date Value   11/02/2017 152 (H)   10/17/2014 142 (H)   09/18/2012 152 (H)      Blood Pressure:   BP Readings from Last 3 Encounters:   12/17/18 116/76   12/13/17 120/78   10/27/17 126/68       Immunization History   Administered Date(s) Administered     Influenza, inj, historic,unspecified 09/01/2016, 09/26/2017     Influenza, seasonal,quad inj 6-35 mos 09/27/2017     Influenza,seasonal quad, PF, 36+MOS 10/29/2018     Td,adult,historic,unspecified 01/03/1994, 01/10/2007     Tdap 09/14/2012     There are no preventive care reminders to display for this patient.     Physical Exam      /76   Pulse 64   Resp 16   Ht 5' 8\" (1.727 m)   Wt 187 lb 8 oz (85 kg)   SpO2 97%   BMI 28.51 kg/m      General " Appearance:    Alert, cooperative, no distress, appears stated age   Eyes:   No scleral icterus or conjunctival irritation       Ears:    Normal TM's and external ear canals, both ears   Throat:   Lips, mucosa, and tongue normal; teeth and gums normal   Neck:   Supple, symmetrical, trachea midline, no adenopathy;        thyroid:  No enlargement/tenderness/nodules   Lungs:     Clear to auscultation bilaterally, respirations unlabored, no wheezes or crackles   Heart:    Regular rate and rhythm,  no murmur, rub   or gallop   Abdomen:     Soft, non-tender, bowel sounds active,     no masses, no organomegaly     Extremities:   Extremities normal, atraumatic, no cyanosis or edema   Skin:   Skin color, texture, turgor normal, no rashes or lesions   Neurologic:   Normal strength and sensation        throughout on gross examination.     Genitalia:    Normal male without lesion, discharge or tenderness   Rectal:    Normal tone, normal prostate, no masses or tenderness;

## 2021-06-24 NOTE — PROGRESS NOTES
Assessment/Plan:    1. Contact dermatitis, unspecified contact dermatitis type, unspecified trigger  Rash is consistent with a contact dermatitis of unknown cause.  Patient advised to continue with Zyrtec 10 mg daily along with Benadryl at night to help him sleep.  Prescription for triamcinolone cream provided.  He should apply this to affected areas twice daily as needed for 2 weeks.  Patient should return to clinic for reevaluation of rash persists or worsens or with the development of any new symptoms.  He is comfortable with this plan.  - triamcinolone (KENALOG) 0.1 % cream; Apply to affected areas twice daily for two weeks.  Dispense: 30 g; Refill: 0    Subjective:    Bi Bae is a 58-year-old male seen today with concerns of possible hives.  Patient first noticed the rash appear about a week ago.  He describes it as a red and very itchy which is making it difficult for him to sleep at night.  He denies any new exposures in his environment.  No recent travel.  No change in his diet.  No new hygiene products or laundry soaps.  He denies having contacts with similar symptoms.  He feels well otherwise.  Denies any recent illness.  No fevers, chills, cough.  He has been taking Zyrtec for the last 5 days.  Does not feel that this is helping.  Rash initially started on his arms and has since spread to his left lower abdomen.  He denies any pustules or drainage.  No additional concerns today. Review of systems is as stated in HPI, and the remainder of the 10 system review is otherwise unremarkable.    Past Medical History, Family History, and Social History reviewed.    No past surgical history on file.     No family history on file.     No past medical history on file.     Social History     Tobacco Use     Smoking status: Never Smoker     Smokeless tobacco: Former User   Substance Use Topics     Alcohol use: Not on file     Drug use: Not on file        Current Outpatient Medications   Medication Sig Dispense  "Refill     acyclovir (ZOVIRAX) 800 MG tablet TAKE 1 TABLET BY MOUTH FOUR TIMES DAILY.. 20 tablet 3     pantoprazole (PROTONIX) 40 MG tablet TAKE 1 TABLET DAILY. DUE   FOR OFFICE VISIT PRIOR TO  FURTHER REFILLS. 90 tablet 3     sildenafil, antihypertensive, (REVATIO) 20 mg tablet TAKE ONE TABLET THREE TIMES A DAY 30 tablet 4     sucralfate (CARAFATE) 1 gram tablet Take 1 g by mouth daily .             triamcinolone (KENALOG) 0.1 % cream Apply to affected areas twice daily for two weeks. 30 g 0     No current facility-administered medications for this visit.           Objective:    Vitals:    02/13/19 0920   BP: 120/60   Patient Site: Left Arm   Patient Position: Sitting   Cuff Size: Adult Regular   Pulse: 61   SpO2: 99%   Weight: 191 lb (86.6 kg)   Height: 5' 8\" (1.727 m)      Body mass index is 29.04 kg/m .      General Appearance:  Alert, cooperative, no distress, appears stated age   Lungs:   Clear to auscultation bilaterally, respirations unlabored.    Heart:  Regular rate and rhythm, S1, S2 normal.   Skin:  Diffuse maculopapular rash noted on bilateral arms and left lower abdomen.  Rash is pruritic, nontender, no drainage.  Skin is otherwise warm, dry with normal texture and turgor.       This note has been dictated using voice recognition software. Any grammatical or context distortions are unintentional and inherent to the use of this software.     "

## 2021-06-30 NOTE — PROGRESS NOTES
"Progress Notes by Sona Pineda AuD at 4/12/2021  8:40 AM     Author: Sona Pineda AuD Service: -- Author Type: Audiologist    Filed: 4/12/2021  9:09 AM Encounter Date: 4/12/2021 Status: Signed    : Sona Pineda AuD (Audiologist)       Audiology Report    Summary: Audiology visit completed. Please see audiogram below or in \"media\" tab for case history and results.     Plan: The patient is returned to ENT for follow up. Return for retesting with ENT recommendation. Bi is a borderline hearing aid candidate. Should he perceive difficulty hearing, it is recommended that he return for a hearing aid evaluation. Return for annual monitoring every 1-2 years.    Eliud Cannon, CCC-A  Clinical Audiologist   [place] #[id number]             "

## 2021-07-03 NOTE — ADDENDUM NOTE
Addendum Note by Andrew Bennett MD at 2/7/2017  5:00 PM     Author: Andrew Bennett MD Service: -- Author Type: Physician    Filed: 2/7/2017  5:00 PM Encounter Date: 1/18/2017 Status: Signed    : Andrew Bennett MD (Physician)    Addended by: ANDREW BENNETT on: 2/7/2017 05:00 PM        Modules accepted: Orders

## 2021-07-11 ENCOUNTER — HEALTH MAINTENANCE LETTER (OUTPATIENT)
Age: 61
End: 2021-07-11

## 2021-08-12 ENCOUNTER — MYC MEDICAL ADVICE (OUTPATIENT)
Dept: FAMILY MEDICINE | Facility: CLINIC | Age: 61
End: 2021-08-12

## 2021-08-12 DIAGNOSIS — C61 PROSTATE CANCER (H): Primary | ICD-10-CM

## 2021-09-05 ENCOUNTER — HEALTH MAINTENANCE LETTER (OUTPATIENT)
Age: 61
End: 2021-09-05

## 2021-09-14 ENCOUNTER — OFFICE VISIT (OUTPATIENT)
Dept: FAMILY MEDICINE | Facility: CLINIC | Age: 61
End: 2021-09-14
Payer: COMMERCIAL

## 2021-09-14 VITALS
OXYGEN SATURATION: 97 % | HEART RATE: 78 BPM | SYSTOLIC BLOOD PRESSURE: 118 MMHG | BODY MASS INDEX: 28.07 KG/M2 | WEIGHT: 184.6 LBS | DIASTOLIC BLOOD PRESSURE: 72 MMHG

## 2021-09-14 DIAGNOSIS — H66.001 NON-RECURRENT ACUTE SUPPURATIVE OTITIS MEDIA OF RIGHT EAR WITHOUT SPONTANEOUS RUPTURE OF TYMPANIC MEMBRANE: Primary | ICD-10-CM

## 2021-09-14 DIAGNOSIS — H60.391 INFECTIVE OTITIS EXTERNA, RIGHT: ICD-10-CM

## 2021-09-14 PROBLEM — N40.1 BENIGN PROSTATIC HYPERPLASIA WITH URINARY OBSTRUCTION: Status: ACTIVE | Noted: 2021-02-12

## 2021-09-14 PROBLEM — N13.8 BENIGN PROSTATIC HYPERPLASIA WITH URINARY OBSTRUCTION: Status: ACTIVE | Noted: 2021-02-12

## 2021-09-14 PROBLEM — R97.20 RAISED PROSTATE SPECIFIC ANTIGEN: Status: ACTIVE | Noted: 2021-01-29

## 2021-09-14 PROBLEM — C61 PRIMARY MALIGNANT NEOPLASM OF PROSTATE (H): Status: ACTIVE | Noted: 2021-02-12

## 2021-09-14 PROCEDURE — 99214 OFFICE O/P EST MOD 30 MIN: CPT | Performed by: STUDENT IN AN ORGANIZED HEALTH CARE EDUCATION/TRAINING PROGRAM

## 2021-09-14 RX ORDER — CIPROFLOXACIN AND DEXAMETHASONE 3; 1 MG/ML; MG/ML
4 SUSPENSION/ DROPS AURICULAR (OTIC) 2 TIMES DAILY
Qty: 2.8 ML | Refills: 0 | Status: SHIPPED | OUTPATIENT
Start: 2021-09-14 | End: 2021-09-21

## 2021-09-14 RX ORDER — DIAZEPAM 5 MG
TABLET ORAL
COMMUNITY
Start: 2021-07-27

## 2021-09-14 NOTE — PROGRESS NOTES
"  Assessment and Plan     61-year-old male who presents with right mild ear pain, difficulty hearing and plugged feeling in his right ear going on for approximately a week.  Occurred after swimming during the weekend.  On exam patient has discharge in the ear canal with redness and swelling that goes into the tympanic membrane.  Thus decided to treat him for otitis externa and otitis media.    1. Non-recurrent acute suppurative otitis media of right ear without spontaneous rupture of tympanic membrane  - amoxicillin-clavulanate (AUGMENTIN) 875-125 MG tablet; Take 1 tablet by mouth 2 times daily for 7 days  Dispense: 14 tablet; Refill: 0    2. Infective otitis externa, right  - ciprofloxacin-dexamethasone (CIPRODEX) 0.3-0.1 % otic suspension; Place 4 drops into the right ear 2 times daily for 7 days  Dispense: 2.8 mL; Refill: 0    Follow up: PRN  Options for treatment and follow-up care were reviewed with the patient and/or guardian. Bi Bae and/or guardian engaged in the decision making process and verbalized understanding of the options discussed and agreed with the final plan.    Dr. Milton Nicholson         HPI:   Bi Bae is a 61 year old  male who presents for:    Chief Complaint   Patient presents with     concerns     plugged right ear mostly but bilateral.      Patient was swimming on September 5th approximately a week ago and got some water in right ear that was difficult to remove. Wife put \"swimmer's ear\" drops on Sunday which didn't help.  He has been having a feeling like his ear is plugged and difficulty hearing since. No discharge from the ear. Mild pain.          PMHX:     Patient Active Problem List   Diagnosis     Burning Sensation (Dysesthesia)     Esophageal Reflux     HTN (hypertension)     Sinusitis     Chest pain     Hemorrhage of rectum and anus     Gastroesophageal reflux disease without esophagitis     Hives     Contact dermatitis, unspecified contact dermatitis type, " unspecified trigger     Raised prostate specific antigen     Primary malignant neoplasm of prostate (H)     Benign prostatic hyperplasia with urinary obstruction       Current Outpatient Medications   Medication Sig Dispense Refill     acyclovir (ZOVIRAX) 800 MG tablet [ACYCLOVIR (ZOVIRAX) 800 MG TABLET] TAKE 1 TABLET BY MOUTH FOUR TIMES DAILY.. 20 tablet 3     ascorbic acid, vitamin C, (VITAMIN C) 1000 MG tablet [ASCORBIC ACID, VITAMIN C, (VITAMIN C) 1000 MG TABLET] Take 1,000 mg by mouth daily.       cetirizine (ZYRTEC) 10 MG tablet [CETIRIZINE (ZYRTEC) 10 MG TABLET] Take 10 mg by mouth 2 (two) times a day.       fluticasone propionate (FLONASE) 50 mcg/actuation nasal spray [FLUTICASONE PROPIONATE (FLONASE) 50 MCG/ACTUATION NASAL SPRAY] 1 spray into each nostril daily. 16 g 6     multivitamin therapeutic tablet [MULTIVITAMIN THERAPEUTIC TABLET] Take 1 tablet by mouth daily.       pantoprazole (PROTONIX) 40 MG tablet [PANTOPRAZOLE (PROTONIX) 40 MG TABLET] Take 1 tablet (40 mg total) by mouth daily. 90 tablet 3     sildenafiL (VIAGRA) 100 MG tablet [SILDENAFIL (VIAGRA) 100 MG TABLET] Take 1 tablet (100 mg total) by mouth as needed for erectile dysfunction. 30 tablet 3     sucralfate (CARAFATE) 1 gram tablet [SUCRALFATE (CARAFATE) 1 GRAM TABLET] TAKE 1 TABLET DAILY 90 tablet 3     tamsulosin (FLOMAX) 0.4 mg cap [TAMSULOSIN (FLOMAX) 0.4 MG CAP]        zinc sulfate (ZINC-15 ORAL) [ZINC SULFATE (ZINC-15 ORAL)] Take by mouth.       diazepam (VALIUM) 5 MG tablet TAKE 1 TABLET BY MOUTH AS NEEDED FOR ANXIETY PRIOR TO MRI         Social History     Tobacco Use     Smoking status: Never Smoker     Smokeless tobacco: Former User   Substance Use Topics     Alcohol use: Not on file     Drug use: Not on file       Social History     Social History Narrative     Not on file       No Known Allergies    No results found for this or any previous visit (from the past 24 hour(s)).         Review of Systems:    ROS: 10 point ROS neg  other than the symptoms noted above in the HPI.         Physical Exam:     Vitals:    09/14/21 1711 09/14/21 1715   BP: 137/82 118/72   BP Location: Left arm Left arm   Patient Position: Sitting Sitting   Cuff Size: Adult Large Adult Large   Pulse:  78   SpO2:  97%   Weight:  83.7 kg (184 lb 9.6 oz)     Body mass index is 28.07 kg/m .    General appearance: Alert, cooperative, no distress, appears stated age  Head: Normocephalic, atraumatic, without obvious abnormality  Eyes: Pupils equal round, reactive.  Conjunctiva clear.  Ears:  discharge in ear canal with redness and swelling in canal, TM is also swollen and red. No perforation seen.   Nose: Nares normal, no drainage.  Throat: Lips, mucosa, tongue normal mucosa pink and moist  Neck: Supple, symmetric, trachea midlin  Lungs: Clear to auscultation bilaterally, no wheezing or crackles present.  Respirations unlabored  Heart: Regular rate and rhythm, normal S1 and S2, no murmur, rub or gallop.

## 2021-09-17 ENCOUNTER — LAB (OUTPATIENT)
Dept: LAB | Facility: CLINIC | Age: 61
End: 2021-09-17
Payer: COMMERCIAL

## 2021-09-17 DIAGNOSIS — C61 PROSTATE CANCER (H): ICD-10-CM

## 2021-09-17 LAB — PSA SERPL-MCNC: 1.54 UG/L (ref 0–4.5)

## 2021-09-17 PROCEDURE — 36415 COLL VENOUS BLD VENIPUNCTURE: CPT

## 2021-09-17 PROCEDURE — 84153 ASSAY OF PSA TOTAL: CPT

## 2021-09-21 ENCOUNTER — TRANSFERRED RECORDS (OUTPATIENT)
Dept: HEALTH INFORMATION MANAGEMENT | Facility: CLINIC | Age: 61
End: 2021-09-21

## 2021-09-27 ENCOUNTER — MYC REFILL (OUTPATIENT)
Dept: FAMILY MEDICINE | Facility: CLINIC | Age: 61
End: 2021-09-27

## 2021-09-27 DIAGNOSIS — B00.9 HSV INFECTION: ICD-10-CM

## 2021-09-27 NOTE — TELEPHONE ENCOUNTER
"Routing refill request to provider for review/approval because:  A break in medication    Last Written Prescription Date:  12/17/2018  Last Fill Quantity: 20,  # refills: 3   Last office visit provider:  1/25/2021     Requested Prescriptions   Pending Prescriptions Disp Refills     acyclovir (ZOVIRAX) 800 MG tablet 20 tablet 3       Antivirals for Herpes Protocol Passed - 9/27/2021  7:02 AM        Passed - Patient is age 12 or older        Passed - Recent (12 mo) or future (30 days) visit within the authorizing provider's specialty     Patient has had an office visit with the authorizing provider or a provider within the authorizing providers department within the previous 12 mos or has a future within next 30 days. See \"Patient Info\" tab in inbasket, or \"Choose Columns\" in Meds & Orders section of the refill encounter.              Passed - Medication is active on med list        Passed - Normal serum creatinine on file in past 12 months     Recent Labs   Lab Test 01/19/21  0751   CR 1.16       Ok to refill medication if creatinine is low               Swetha Mohamud RN 09/27/21 3:56 PM  "

## 2021-09-28 RX ORDER — ACYCLOVIR 800 MG/1
800 TABLET ORAL 4 TIMES DAILY
Qty: 20 TABLET | Refills: 3 | Status: SHIPPED | OUTPATIENT
Start: 2021-09-28 | End: 2023-09-14

## 2021-09-29 ENCOUNTER — MYC MEDICAL ADVICE (OUTPATIENT)
Dept: FAMILY MEDICINE | Facility: CLINIC | Age: 61
End: 2021-09-29

## 2021-09-30 ENCOUNTER — OFFICE VISIT (OUTPATIENT)
Dept: FAMILY MEDICINE | Facility: CLINIC | Age: 61
End: 2021-09-30
Payer: COMMERCIAL

## 2021-09-30 VITALS
HEART RATE: 64 BPM | BODY MASS INDEX: 28.39 KG/M2 | WEIGHT: 186.7 LBS | DIASTOLIC BLOOD PRESSURE: 68 MMHG | SYSTOLIC BLOOD PRESSURE: 118 MMHG | OXYGEN SATURATION: 98 %

## 2021-09-30 DIAGNOSIS — H72.91 PERFORATION OF RIGHT TYMPANIC MEMBRANE: Primary | ICD-10-CM

## 2021-09-30 PROCEDURE — 99213 OFFICE O/P EST LOW 20 MIN: CPT | Performed by: STUDENT IN AN ORGANIZED HEALTH CARE EDUCATION/TRAINING PROGRAM

## 2021-09-30 NOTE — PROGRESS NOTES
Assessment and Plan     7-year-old male who I saw for otitis media and otitis externa on 9/14/2021.  Treated with Augmentin and Ciprodex.  He follows up today with continued crackling sensation and right ear pain.  On exam patient has tympanic membrane perforation.  Appears his infection though has resolved.  I recommended watchful waiting for an additional 4 weeks for 6 weeks total.  If symptoms are still present I recommend he follow-up with me for reexamination and considering referral to ENT.    1. Perforation of right tympanic membrane    Follow up: 4 weeks if hearing changes still present  Options for treatment and follow-up care were reviewed with the patient and/or guardian. Bi Bae and/or guardian engaged in the decision making process and verbalized understanding of the options discussed and agreed with the final plan.    Dr. Milton Nicholson         HPI:   Bi Bae is a 61 year old  male who presents for:    Chief Complaint   Patient presents with     ear pain     right ear still hurts.      Patient seen on 9/14/2021.  At that time patient having pain in his right ear after having gone swimming the weekend before.  Patient had obvious otitis externa but I thought his tympanic membrane was swollen to also treated him as otitis media as well with Augmentin and Ciprodex drops.  Patient presents today with continued symptoms of right ear pain as well as a crackly hearing changes on the right.  Overall he does not think his hearing is worsened.  He states he does feel significantly improved however.         PMHX:     Patient Active Problem List   Diagnosis     Burning Sensation (Dysesthesia)     Esophageal Reflux     HTN (hypertension)     Sinusitis     Chest pain     Hemorrhage of rectum and anus     Gastroesophageal reflux disease without esophagitis     Hives     Contact dermatitis, unspecified contact dermatitis type, unspecified trigger     Raised prostate specific antigen     Primary  malignant neoplasm of prostate (H)     Benign prostatic hyperplasia with urinary obstruction       Current Outpatient Medications   Medication Sig Dispense Refill     acyclovir (ZOVIRAX) 800 MG tablet Take 1 tablet (800 mg) by mouth 4 times daily [ACYCLOVIR (ZOVIRAX) 800 MG TABLET] TAKE 1 TABLET BY MOUTH FOUR TIMES DAILY.. 20 tablet 3     ascorbic acid, vitamin C, (VITAMIN C) 1000 MG tablet [ASCORBIC ACID, VITAMIN C, (VITAMIN C) 1000 MG TABLET] Take 1,000 mg by mouth daily.       cetirizine (ZYRTEC) 10 MG tablet [CETIRIZINE (ZYRTEC) 10 MG TABLET] Take 10 mg by mouth 2 (two) times a day.       diazepam (VALIUM) 5 MG tablet TAKE 1 TABLET BY MOUTH AS NEEDED FOR ANXIETY PRIOR TO MRI       fluticasone propionate (FLONASE) 50 mcg/actuation nasal spray [FLUTICASONE PROPIONATE (FLONASE) 50 MCG/ACTUATION NASAL SPRAY] 1 spray into each nostril daily. 16 g 6     multivitamin therapeutic tablet [MULTIVITAMIN THERAPEUTIC TABLET] Take 1 tablet by mouth daily.       pantoprazole (PROTONIX) 40 MG tablet [PANTOPRAZOLE (PROTONIX) 40 MG TABLET] Take 1 tablet (40 mg total) by mouth daily. 90 tablet 3     sildenafiL (VIAGRA) 100 MG tablet [SILDENAFIL (VIAGRA) 100 MG TABLET] Take 1 tablet (100 mg total) by mouth as needed for erectile dysfunction. 30 tablet 3     sucralfate (CARAFATE) 1 gram tablet [SUCRALFATE (CARAFATE) 1 GRAM TABLET] TAKE 1 TABLET DAILY 90 tablet 3     tamsulosin (FLOMAX) 0.4 mg cap [TAMSULOSIN (FLOMAX) 0.4 MG CAP]        zinc sulfate (ZINC-15 ORAL) [ZINC SULFATE (ZINC-15 ORAL)] Take by mouth.         Social History     Tobacco Use     Smoking status: Never Smoker     Smokeless tobacco: Former User   Substance Use Topics     Alcohol use: Not on file     Drug use: Not on file       Social History     Social History Narrative     Not on file       No Known Allergies    No results found for this or any previous visit (from the past 24 hour(s)).         Review of Systems:    ROS: 10 point ROS neg other than the symptoms  noted above in the HPI.         Physical Exam:     Vitals:    09/30/21 1507   BP: 118/68   BP Location: Left arm   Patient Position: Sitting   Cuff Size: Adult Regular   Pulse: 64   SpO2: 98%   Weight: 84.7 kg (186 lb 11.2 oz)     Body mass index is 28.39 kg/m .    General appearance: Alert, cooperative, no distress, appears stated age  Head: Normocephalic, atraumatic, without obvious abnormality  Eyes: Pupils equal round, reactive.  Conjunctiva clear.  Ears:  No cerumen, right ear canal contains dried crusting.  No superlative drainage.  Tympanic membrane appears gray and dull but perforation seen.  TM's grey dull with structures seen on left  Nose: Nares normal, no drainage.  Throat: Lips, mucosa, tongue normal mucosa pink and moist  Neck: Supple, symmetric, trachea midline  Lungs: Clear to auscultation bilaterally, no wheezing or crackles present.  Respirations unlabored  Heart: Regular rate and rhythm, normal S1 and S2, no murmur, rub or gallop.

## 2021-10-07 ENCOUNTER — TRANSFERRED RECORDS (OUTPATIENT)
Dept: HEALTH INFORMATION MANAGEMENT | Facility: CLINIC | Age: 61
End: 2021-10-07

## 2021-11-03 ENCOUNTER — DOCUMENTATION ONLY (OUTPATIENT)
Dept: LAB | Facility: CLINIC | Age: 61
End: 2021-11-03

## 2021-11-03 ENCOUNTER — TELEPHONE (OUTPATIENT)
Dept: LAB | Facility: CLINIC | Age: 61
End: 2021-11-03

## 2021-11-04 ENCOUNTER — APPOINTMENT (OUTPATIENT)
Dept: LAB | Facility: CLINIC | Age: 61
End: 2021-11-04
Payer: COMMERCIAL

## 2021-11-04 NOTE — PROGRESS NOTES
Pt needs a blood draw for [facility] per Dr. Coleman's instructions.      Please place orders.  Pt's appointment is this morning.    Thanks    Pt has a kit from [facility]

## 2021-11-05 DIAGNOSIS — K21.9 GASTROESOPHAGEAL REFLUX DISEASE WITHOUT ESOPHAGITIS: ICD-10-CM

## 2021-11-08 ENCOUNTER — OFFICE VISIT (OUTPATIENT)
Dept: FAMILY MEDICINE | Facility: CLINIC | Age: 61
End: 2021-11-08
Payer: COMMERCIAL

## 2021-11-08 VITALS
DIASTOLIC BLOOD PRESSURE: 76 MMHG | SYSTOLIC BLOOD PRESSURE: 118 MMHG | WEIGHT: 186.2 LBS | HEART RATE: 56 BPM | OXYGEN SATURATION: 99 % | BODY MASS INDEX: 28.31 KG/M2

## 2021-11-08 DIAGNOSIS — H72.91 PERFORATION OF RIGHT TYMPANIC MEMBRANE: Primary | ICD-10-CM

## 2021-11-08 DIAGNOSIS — N52.9 ERECTILE DYSFUNCTION, UNSPECIFIED ERECTILE DYSFUNCTION TYPE: ICD-10-CM

## 2021-11-08 PROBLEM — K44.9 DIAPHRAGMATIC HERNIA: Status: ACTIVE | Noted: 2021-01-28

## 2021-11-08 PROBLEM — K63.5 POLYP OF COLON: Status: ACTIVE | Noted: 2018-09-18

## 2021-11-08 PROBLEM — D12.0 BENIGN NEOPLASM OF CECUM: Status: ACTIVE | Noted: 2018-09-20

## 2021-11-08 PROCEDURE — 99213 OFFICE O/P EST LOW 20 MIN: CPT | Performed by: STUDENT IN AN ORGANIZED HEALTH CARE EDUCATION/TRAINING PROGRAM

## 2021-11-08 RX ORDER — SUCRALFATE 1 G/1
TABLET ORAL
Qty: 90 TABLET | Refills: 0 | Status: SHIPPED | OUTPATIENT
Start: 2021-11-08 | End: 2022-04-01

## 2021-11-08 NOTE — TELEPHONE ENCOUNTER
"Last Written Prescription Date:  1/17/21  Last Fill Quantity: 90,  # refills: 3   Last office visit provider:  1/25/21     Requested Prescriptions   Pending Prescriptions Disp Refills     sucralfate (CARAFATE) 1 GM tablet [Pharmacy Med Name: SUCRALFATE TAB 1GM] 90 tablet 3     Sig: TAKE 1 TABLET DAILY       Miscellaneous Gastrointestinal Agents Passed - 11/5/2021  6:51 PM        Passed - Recent (12 mo) or future (30 days) visit within the authorizing provider's specialty     Patient has had an office visit with the authorizing provider or a provider within the authorizing providers department within the previous 12 mos or has a future within next 30 days. See \"Patient Info\" tab in inbasket, or \"Choose Columns\" in Meds & Orders section of the refill encounter.              Passed - Medication is active on med list        Passed - Patient is 18 years of age or older             tory hdez RN 11/08/21 10:18 AM  "

## 2021-11-08 NOTE — PROGRESS NOTES
Assessment and Plan     7-year-old male presenting in follow-up for tympanic membrane perforation after otitis media otitis externa infection.  Tympanic membrane is now well-healed with no perforation seen.    1. Perforation of right tympanic membrane    Follow up: PRN  Options for treatment and follow-up care were reviewed with the patient and/or guardian. Bi Bae and/or guardian engaged in the decision making process and verbalized understanding of the options discussed and agreed with the final plan.    Dr. Milton Nicholson         HPI:   Bi Bae is a 61 year old  male who presents for:    Chief Complaint   Patient presents with     recheck ears     Originally saw patient on 9/14/2021.  Patient having discharge from right ear and some mild pain.  Infection present on exam.  Treated him for both otitis externa and media with Augmentin and Ciprodex drops.  Patient presented a couple weeks later with continuing crackling-like sensation in the ear.  Tympanic perforation seen.  I recommended patient do watchful waiting and represent for recheck of the ear today.    Patient tells me that his ear is still mildly painful and still occasional crackling going on in his ear.         PMHX:     Patient Active Problem List   Diagnosis     Burning Sensation (Dysesthesia)     Esophageal Reflux     HTN (hypertension)     Sinusitis     Chest pain     Hemorrhage of rectum and anus     Gastroesophageal reflux disease without esophagitis     Hives     Contact dermatitis, unspecified contact dermatitis type, unspecified trigger     Raised prostate specific antigen     Primary malignant neoplasm of prostate (H)     Benign prostatic hyperplasia with urinary obstruction     Polyp of colon     Diaphragmatic hernia     Benign neoplasm of cecum       Current Outpatient Medications   Medication Sig Dispense Refill     ascorbic acid, vitamin C, (VITAMIN C) 1000 MG tablet [ASCORBIC ACID, VITAMIN C, (VITAMIN C) 1000 MG TABLET]  Take 1,000 mg by mouth daily.       cetirizine (ZYRTEC) 10 MG tablet [CETIRIZINE (ZYRTEC) 10 MG TABLET] Take 10 mg by mouth 2 (two) times a day.       diazepam (VALIUM) 5 MG tablet TAKE 1 TABLET BY MOUTH AS NEEDED FOR ANXIETY PRIOR TO MRI       fluticasone propionate (FLONASE) 50 mcg/actuation nasal spray [FLUTICASONE PROPIONATE (FLONASE) 50 MCG/ACTUATION NASAL SPRAY] 1 spray into each nostril daily. 16 g 6     multivitamin therapeutic tablet [MULTIVITAMIN THERAPEUTIC TABLET] Take 1 tablet by mouth daily.       pantoprazole (PROTONIX) 40 MG tablet [PANTOPRAZOLE (PROTONIX) 40 MG TABLET] Take 1 tablet (40 mg total) by mouth daily. 90 tablet 3     sildenafiL (VIAGRA) 100 MG tablet [SILDENAFIL (VIAGRA) 100 MG TABLET] Take 1 tablet (100 mg total) by mouth as needed for erectile dysfunction. 30 tablet 3     sucralfate (CARAFATE) 1 GM tablet TAKE 1 TABLET DAILY 90 tablet 0     tamsulosin (FLOMAX) 0.4 mg cap [TAMSULOSIN (FLOMAX) 0.4 MG CAP]        zinc sulfate (ZINC-15 ORAL) [ZINC SULFATE (ZINC-15 ORAL)] Take by mouth.       acyclovir (ZOVIRAX) 800 MG tablet Take 1 tablet (800 mg) by mouth 4 times daily [ACYCLOVIR (ZOVIRAX) 800 MG TABLET] TAKE 1 TABLET BY MOUTH FOUR TIMES DAILY.. (Patient not taking: Reported on 11/8/2021) 20 tablet 3       Social History     Tobacco Use     Smoking status: Never Smoker     Smokeless tobacco: Former User   Substance Use Topics     Alcohol use: Not on file     Drug use: Not on file       Social History     Social History Narrative     Not on file       No Known Allergies    No results found for this or any previous visit (from the past 24 hour(s)).         Review of Systems:    ROS: 10 point ROS neg other than the symptoms noted above in the HPI.         Physical Exam:     Vitals:    11/08/21 1601   BP: 118/76   BP Location: Right arm   Patient Position: Sitting   Cuff Size: Adult Regular   Pulse: 56   SpO2: 99%   Weight: 84.5 kg (186 lb 3.2 oz)     Body mass index is 28.31 kg/m .    General  appearance: Alert, cooperative, no distress, appears stated age  Head: Normocephalic, atraumatic, without obvious abnormality  Eyes: Pupils equal round, reactive.  Conjunctiva clear.  Nose: Nares normal, no drainage.  Ears:  No cerumen, TM's grey dull with structures seen bilaterally, has auditory canals benign appearing nodules  Throat: Lips, mucosa, tongue normal mucosa pink and moist  Neck: Supple, symmetric, trachea midline

## 2021-11-10 RX ORDER — SILDENAFIL 100 MG/1
TABLET, FILM COATED ORAL
Qty: 30 TABLET | Refills: 3 | Status: SHIPPED | OUTPATIENT
Start: 2021-11-10 | End: 2022-11-29

## 2021-11-10 NOTE — TELEPHONE ENCOUNTER
"Routing refill request to provider for review/approval because:  Pt is on an alpha blocker. Needs PCP approval.    Last Written Prescription Date:  10/27/2020  Last Fill Quantity: 30,  # refills: 3   Last office visit provider:  11/08/2021 with Dr White.  01/25/2021 with PCP, Dr Coleman.    Requested Prescriptions   Pending Prescriptions Disp Refills     sildenafil (VIAGRA) 100 MG tablet [Pharmacy Med Name: SILDENAFIL TAB 100MG] 30 tablet 3     Sig: TAKE 1 TABLET AS NEEDED FORERECTILE DYSFUNCTION       Erectile Dysfuction Protocol Failed - 11/8/2021 12:49 PM        Failed - Absence of Alpha Blockers on Med list        Passed - Absence of nitrates on medication list        Passed - Recent (12 mo) or future (30 days) visit within the authorizing provider's specialty     Patient has had an office visit with the authorizing provider or a provider within the authorizing providers department within the previous 12 mos or has a future within next 30 days. See \"Patient Info\" tab in inbasket, or \"Choose Columns\" in Meds & Orders section of the refill encounter.              Passed - Medication is active on med list        Passed - Patient is age 18 or older             Jennifer David 11/09/21 11:25 PM  "

## 2021-12-03 ENCOUNTER — MYC REFILL (OUTPATIENT)
Dept: FAMILY MEDICINE | Facility: CLINIC | Age: 61
End: 2021-12-03
Payer: COMMERCIAL

## 2021-12-03 DIAGNOSIS — J31.0 CHRONIC RHINITIS: ICD-10-CM

## 2021-12-05 RX ORDER — FLUTICASONE PROPIONATE 50 MCG
1 SPRAY, SUSPENSION (ML) NASAL DAILY
Qty: 16 G | Refills: 11 | Status: SHIPPED | OUTPATIENT
Start: 2021-12-05 | End: 2022-12-07

## 2021-12-05 NOTE — TELEPHONE ENCOUNTER
"  Outpatient Medication Detail     Disp Refills Start End JOSEFINA   fluticasone propionate (FLONASE) 50 mcg/actuation nasal spray 16 g 6 8/9/2019  --   Sig - Route: 1 spray into each nostril daily. - Nasal   Sent to pharmacy as: fluticasone propionate (FLONASE) 50 mcg/actuation nasal spray   E-Prescribing Status: Receipt confirmed by pharmacy (8/9/2019  9:09 AM CDT)       fluticasone propionate (FLONASE) 50 mcg/actuation nasal spray [[id number]]    Electronically signed by: Neftali Coleman MD on 08/09/19 0909 Status: Active   Ordering user: Neftali Coleman MD 08/09/19 0909 Authorized by: Neftali Coleman MD   Frequency: DAILY 08/09/19 - Until Discontinued Released by: Neftali Coleman MD 08/09/19 0909   Diagnoses  Chronic rhinitis [J31.0]       Order Transmission Method    E-Prescribed       Associated Diagnoses    Chronic rhinitis [J31.0]  - Primary          Last office visit provider:  11/8/21    Routing refill request to provider for review/approval because:  A break in medication         Requested Prescriptions   Pending Prescriptions Disp Refills     FLUTICASONE PROPIONATE (NASAL) 50 MCG/ACT SUSP 16 g 6     Sig: Spray 1 spray in nostril daily       Nasal Allergy Protocol Passed - 12/3/2021  9:17 AM        Passed - Patient is age 12 or older        Passed - Recent (12 mo) or future (30 days) visit within the authorizing provider's specialty     Patient has had an office visit with the authorizing provider or a provider within the authorizing providers department within the previous 12 mos or has a future within next 30 days. See \"Patient Info\" tab in inbasket, or \"Choose Columns\" in Meds & Orders section of the refill encounter.              Passed - Medication is active on med list             Rylie Thakur RN 12/05/21 3:41 PM  "

## 2022-02-09 DIAGNOSIS — K21.9 GASTROESOPHAGEAL REFLUX DISEASE WITHOUT ESOPHAGITIS: ICD-10-CM

## 2022-02-10 RX ORDER — PANTOPRAZOLE SODIUM 40 MG/1
TABLET, DELAYED RELEASE ORAL
Qty: 90 TABLET | Refills: 2 | Status: SHIPPED | OUTPATIENT
Start: 2022-02-10 | End: 2022-10-08

## 2022-02-11 NOTE — TELEPHONE ENCOUNTER
"Last Written Prescription Date:  02/21/2021  Last Fill Quantity: 90,  # refills: 3   Last office visit provider: 11/08/2021 with Dr White      Requested Prescriptions   Pending Prescriptions Disp Refills     pantoprazole (PROTONIX) 40 MG EC tablet [Pharmacy Med Name: PANTOPRAZOLE TAB 40MG DR] 90 tablet 3     Sig: TAKE 1 TABLET DAILY       PPI Protocol Passed - 2/9/2022  1:40 AM        Passed - Not on Clopidogrel (unless Pantoprazole ordered)        Passed - No diagnosis of osteoporosis on record        Passed - Recent (12 mo) or future (30 days) visit within the authorizing provider's specialty     Patient has had an office visit with the authorizing provider or a provider within the authorizing providers department within the previous 12 mos or has a future within next 30 days. See \"Patient Info\" tab in inbasket, or \"Choose Columns\" in Meds & Orders section of the refill encounter.              Passed - Medication is active on med list        Passed - Patient is age 18 or older             Jennifer David 02/10/22 11:37 PM  "

## 2022-03-02 ENCOUNTER — LAB (OUTPATIENT)
Dept: LAB | Facility: CLINIC | Age: 62
End: 2022-03-02
Payer: COMMERCIAL

## 2022-03-02 ENCOUNTER — MYC MEDICAL ADVICE (OUTPATIENT)
Dept: FAMILY MEDICINE | Facility: CLINIC | Age: 62
End: 2022-03-02

## 2022-03-02 ENCOUNTER — MEDICAL CORRESPONDENCE (OUTPATIENT)
Dept: HEALTH INFORMATION MANAGEMENT | Facility: CLINIC | Age: 62
End: 2022-03-02

## 2022-03-02 DIAGNOSIS — Z98.890 HISTORY OF PROSTATE BIOPSY: ICD-10-CM

## 2022-03-02 DIAGNOSIS — R50.9 FEVER, UNSPECIFIED FEVER CAUSE: ICD-10-CM

## 2022-03-02 DIAGNOSIS — R50.9 FEVER, UNSPECIFIED FEVER CAUSE: Primary | ICD-10-CM

## 2022-03-02 LAB
ALBUMIN UR-MCNC: NEGATIVE MG/DL
APPEARANCE UR: CLEAR
BACTERIA #/AREA URNS HPF: ABNORMAL /HPF
BILIRUB UR QL STRIP: NEGATIVE
COLOR UR AUTO: YELLOW
GLUCOSE UR STRIP-MCNC: NEGATIVE MG/DL
HGB UR QL STRIP: ABNORMAL
KETONES UR STRIP-MCNC: NEGATIVE MG/DL
LEUKOCYTE ESTERASE UR QL STRIP: NEGATIVE
NITRATE UR QL: NEGATIVE
PH UR STRIP: 6 [PH] (ref 5–8)
RBC #/AREA URNS AUTO: ABNORMAL /HPF
SP GR UR STRIP: <=1.005 (ref 1–1.03)
SQUAMOUS #/AREA URNS AUTO: ABNORMAL /LPF
UROBILINOGEN UR STRIP-ACNC: 0.2 E.U./DL
WBC #/AREA URNS AUTO: ABNORMAL /HPF

## 2022-03-02 PROCEDURE — 81001 URINALYSIS AUTO W/SCOPE: CPT

## 2022-03-14 ENCOUNTER — MYC MEDICAL ADVICE (OUTPATIENT)
Dept: FAMILY MEDICINE | Facility: CLINIC | Age: 62
End: 2022-03-14
Payer: COMMERCIAL

## 2022-03-14 DIAGNOSIS — E78.5 HYPERLIPIDEMIA LDL GOAL <130: Primary | ICD-10-CM

## 2022-03-16 ENCOUNTER — LAB (OUTPATIENT)
Dept: LAB | Facility: CLINIC | Age: 62
End: 2022-03-16
Payer: COMMERCIAL

## 2022-03-16 DIAGNOSIS — E78.5 HYPERLIPIDEMIA LDL GOAL <130: ICD-10-CM

## 2022-03-16 LAB
CHOLEST SERPL-MCNC: 193 MG/DL
FASTING STATUS PATIENT QL REPORTED: YES
FASTING STATUS PATIENT QL REPORTED: YES
GLUCOSE BLD-MCNC: 95 MG/DL (ref 70–125)
HDLC SERPL-MCNC: 38 MG/DL
LDLC SERPL CALC-MCNC: 128 MG/DL
TRIGL SERPL-MCNC: 135 MG/DL

## 2022-03-16 PROCEDURE — 80061 LIPID PANEL: CPT

## 2022-03-16 PROCEDURE — 36415 COLL VENOUS BLD VENIPUNCTURE: CPT

## 2022-03-16 PROCEDURE — 82947 ASSAY GLUCOSE BLOOD QUANT: CPT

## 2022-03-22 ENCOUNTER — OFFICE VISIT (OUTPATIENT)
Dept: FAMILY MEDICINE | Facility: CLINIC | Age: 62
End: 2022-03-22
Payer: COMMERCIAL

## 2022-03-22 VITALS
HEIGHT: 68 IN | SYSTOLIC BLOOD PRESSURE: 130 MMHG | OXYGEN SATURATION: 99 % | DIASTOLIC BLOOD PRESSURE: 76 MMHG | WEIGHT: 180.2 LBS | HEART RATE: 70 BPM | BODY MASS INDEX: 27.31 KG/M2

## 2022-03-22 DIAGNOSIS — Z00.00 ROUTINE GENERAL MEDICAL EXAMINATION AT A HEALTH CARE FACILITY: ICD-10-CM

## 2022-03-22 DIAGNOSIS — K21.9 GASTROESOPHAGEAL REFLUX DISEASE WITHOUT ESOPHAGITIS: ICD-10-CM

## 2022-03-22 DIAGNOSIS — E78.5 HYPERLIPIDEMIA LDL GOAL <130: Primary | ICD-10-CM

## 2022-03-22 PROCEDURE — 99396 PREV VISIT EST AGE 40-64: CPT | Performed by: FAMILY MEDICINE

## 2022-03-22 ASSESSMENT — PATIENT HEALTH QUESTIONNAIRE - PHQ9
SUM OF ALL RESPONSES TO PHQ QUESTIONS 1-9: 0
10. IF YOU CHECKED OFF ANY PROBLEMS, HOW DIFFICULT HAVE THESE PROBLEMS MADE IT FOR YOU TO DO YOUR WORK, TAKE CARE OF THINGS AT HOME, OR GET ALONG WITH OTHER PEOPLE: NOT DIFFICULT AT ALL
SUM OF ALL RESPONSES TO PHQ QUESTIONS 1-9: 0

## 2022-03-22 NOTE — PROGRESS NOTES
"Bi Bae  /76   Pulse 70   Ht 1.727 m (5' 8\")   Wt 81.7 kg (180 lb 3.2 oz)   SpO2 99%   BMI 27.40 kg/m       Assessment/Plan:                Bi was seen today for physical, results and arthritis.    Diagnoses and all orders for this visit:    Hyperlipidemia LDL goal <130    Gastroesophageal reflux disease without esophagitis    Routine general medical examination at a health care facility         DISCUSSION  See discussion below.  Monitor left-sided inguinal hernia.  The hernia is not notable on exam.  Discussed symptoms to monitor for which would necessitate seeing a surgeon to consider surgical repair.  Subjective:     HPI:    Bi Bae is a 61 year old male is here today for a physical.  He has low-grade prostate cancer is on active surveillance currently following at the [facility].  This was diagnosed after having an elevated PSA done here last year.  He has a history of some arthritis changes in the hands.  He had knee ligamentous injury and attended physical therapy with good improvement and no residual symptoms.  He had a perforated TM due to otitis externa back in September which has since improved.  He has acid reflux.  He had endoscopy in January 2021.  He has a known hiatal hernia.  He takes pantoprazole and sucralfate to manage symptoms.  Stopping medications has led to rebound symptoms so he continues to take them.  Medication management is the most reasonable approach at this point.  Discussed.  Acknowledged risks of long-term use of PPI.  Reviewed colonoscopy noting he will come due in September 2023.  Reviewed and discussed immunizations.  Declines Covid vaccine.  Discussed other aspects of routine health prevention and reviewed his clinical course as discussed.  We also reviewed recent laboratory testing that was obtained prior to his visit showing improvement in cholesterol numbers and a ideal blood sugar number.      As part of his MRI scan in the work-up of his " prostate cancer and incidental left small fat-containing umbilical hernia was noted.      ROS:  Complete review of systems is obtained.  Other than the specific considerations noted above complete review of systems is negative.          Objective:   Medications:  Current Outpatient Medications   Medication     acyclovir (ZOVIRAX) 800 MG tablet     ascorbic acid, vitamin C, (VITAMIN C) 1000 MG tablet     cetirizine (ZYRTEC) 10 MG tablet     fluticasone (FLONASE) 50 MCG/ACT nasal spray     Multiple Vitamins-Minerals (MULTIVITAMIN ADULTS 50+ PO)     multivitamin therapeutic tablet     pantoprazole (PROTONIX) 40 MG EC tablet     sucralfate (CARAFATE) 1 GM tablet     tamsulosin (FLOMAX) 0.4 mg cap     zinc sulfate (ZINC-15 ORAL)     diazepam (VALIUM) 5 MG tablet     sildenafil (VIAGRA) 100 MG tablet     No current facility-administered medications for this visit.        Allergies:   No Known Allergies     Social History     Socioeconomic History     Marital status:      Spouse name: Not on file     Number of children: Not on file     Years of education: Not on file     Highest education level: Not on file   Occupational History     Not on file   Tobacco Use     Smoking status: Never Smoker     Smokeless tobacco: Former User   Substance and Sexual Activity     Alcohol use: Not on file     Drug use: Not on file     Sexual activity: Not on file   Other Topics Concern     Not on file   Social History Narrative     Not on file     Social Determinants of Health     Financial Resource Strain: Not on file   Food Insecurity: Not on file   Transportation Needs: Not on file   Physical Activity: Not on file   Stress: Not on file   Social Connections: Not on file   Intimate Partner Violence: Not on file   Housing Stability: Not on file       No family history on file.     Most Recent Immunizations   Administered Date(s) Administered     DT (PEDS <7y) 01/10/2007     FLU 6-35 months 11/05/2009     Flu, Unspecified 10/08/2021      "Influenza (IIV3) PF 10/22/2014     Influenza Vaccine IM > 6 months Valent IIV4 (Alfuria,Fluzone) 10/29/2018     Influenza Vaccine, 6+MO IM (QUADRIVALENT W/PRESERVATIVES) 10/08/2021     Influenza vaccine ages 6-35 months 10/08/2021     Influenza,INJ,MDCK,PF,Quad >4yrs 10/29/2019     TD (ADULT, 7+) 01/10/2007     TDAP Vaccine (Boostrix) 09/14/2012     Td (Adult), Adsorbed 01/03/1994     Td,adult,historic,unspecified 01/10/2007     Tdap (Adacel,Boostrix) 09/14/2012     Zoster vaccine recombinant adjuvanted (SHINGRIX) 03/18/2020        Wt Readings from Last 3 Encounters:   03/22/22 81.7 kg (180 lb 3.2 oz)   11/08/21 84.5 kg (186 lb 3.2 oz)   09/30/21 84.7 kg (186 lb 11.2 oz)        BP Readings from Last 6 Encounters:   03/22/22 130/76   11/08/21 118/76   09/30/21 118/68   09/14/21 118/72   01/25/21 128/74   12/20/19 124/72        PHYSICAL EXAM:    /76   Pulse 70   Ht 1.727 m (5' 8\")   Wt 81.7 kg (180 lb 3.2 oz)   SpO2 99%   BMI 27.40 kg/m           General Appearance:    Alert, cooperative, no distress   Eyes:   No scleral icterus or conjunctival irritation       Ears:    Normal TM's and external ear canals, both ears   Throat:   Lips, mucosa, and tongue normal; teeth and gums normal   Neck:   Supple, symmetrical, trachea midline, no adenopathy;        thyroid:  No enlargement/tenderness/nodules   Lungs:     Clear to auscultation bilaterally, respirations unlabored, no wheezes or crackles   Heart:    Regular rate and rhythm,  No murmur   Abdomen:    Soft, no distention, no tenderness on palpation, no masses, no organomegaly     Extremities:  No edema, no joint swelling or redness, no evidence of any injuries   Skin:  No concerning skin findings, no suspicious moles, no rashes   Neurologic:  On gross examination there is no motor or sensory deficit.  Patient walks with a normal gait     Genitalia:   Normal testicular anatomy, no inguinal hernias, no skin findings in the genital region "                                     Answers for HPI/ROS submitted by the patient on 3/22/2022  If you checked off any problems, how difficult have these problems made it for you to do your work, take care of things at home, or get along with other people?: Not difficult at all  PHQ9 TOTAL SCORE: 0  Frequency of exercise:: 4-5 days/week  Getting at least 3 servings of Calcium per day:: Yes  Diet:: Regular (no restrictions)  Taking medications regularly:: Yes  Bi-annual eye exam:: Yes  Dental care twice a year:: Yes  Sleep apnea or symptoms of sleep apnea:: None  Additional concerns today:: No  Duration of exercise:: 30-45 minutes

## 2022-03-23 ASSESSMENT — PATIENT HEALTH QUESTIONNAIRE - PHQ9: SUM OF ALL RESPONSES TO PHQ QUESTIONS 1-9: 0

## 2022-03-30 DIAGNOSIS — K21.9 GASTROESOPHAGEAL REFLUX DISEASE WITHOUT ESOPHAGITIS: ICD-10-CM

## 2022-04-01 RX ORDER — SUCRALFATE 1 G/1
TABLET ORAL
Qty: 90 TABLET | Refills: 3 | Status: SHIPPED | OUTPATIENT
Start: 2022-04-01 | End: 2023-03-14

## 2022-04-01 NOTE — TELEPHONE ENCOUNTER
"Last Written Prescription Date:  11/8/21  Last Fill Quantity: 90,  # refills: 0   Last office visit provider: 3/22/22      Requested Prescriptions   Pending Prescriptions Disp Refills     sucralfate (CARAFATE) 1 GM tablet [Pharmacy Med Name: SUCRALFATE TAB 1GM] 90 tablet 0     Sig: TAKE 1 TABLET DAILY       Miscellaneous Gastrointestinal Agents Passed - 3/30/2022  1:06 AM        Passed - Recent (12 mo) or future (30 days) visit within the authorizing provider's specialty     Patient has had an office visit with the authorizing provider or a provider within the authorizing providers department within the previous 12 mos or has a future within next 30 days. See \"Patient Info\" tab in inbasket, or \"Choose Columns\" in Meds & Orders section of the refill encounter.              Passed - Medication is active on med list        Passed - Patient is 18 years of age or older             Sierra Handley RN 04/01/22 8:58 AM  "

## 2022-06-03 NOTE — TELEPHONE ENCOUNTER
Yes, left message on pt phone about visitor restrictions and travel screening. This is also in appt notes    yes

## 2022-07-06 ENCOUNTER — ALLIED HEALTH/NURSE VISIT (OUTPATIENT)
Dept: FAMILY MEDICINE | Facility: CLINIC | Age: 62
End: 2022-07-06
Payer: COMMERCIAL

## 2022-07-06 DIAGNOSIS — Z23 ENCOUNTER FOR IMMUNIZATION: ICD-10-CM

## 2022-07-06 DIAGNOSIS — Z23 ENCOUNTER FOR IMMUNIZATION: Primary | ICD-10-CM

## 2022-07-06 PROCEDURE — 90471 IMMUNIZATION ADMIN: CPT

## 2022-07-06 PROCEDURE — 99207 PR NO CHARGE NURSE ONLY: CPT

## 2022-07-06 PROCEDURE — 90714 TD VACC NO PRESV 7 YRS+ IM: CPT

## 2022-09-16 ENCOUNTER — LAB (OUTPATIENT)
Dept: LAB | Facility: CLINIC | Age: 62
End: 2022-09-16
Payer: COMMERCIAL

## 2022-09-16 DIAGNOSIS — C61 PROSTATE CANCER (H): ICD-10-CM

## 2022-09-16 DIAGNOSIS — J30.9 ALLERGIC RHINITIS, UNSPECIFIED SEASONALITY, UNSPECIFIED TRIGGER: ICD-10-CM

## 2022-09-16 DIAGNOSIS — J34.3 HYPERTROPHY OF NASAL TURBINATES: Primary | ICD-10-CM

## 2022-09-16 DIAGNOSIS — H10.45 OTHER CHRONIC ALLERGIC CONJUNCTIVITIS: ICD-10-CM

## 2022-09-16 LAB
Lab: NORMAL
PERFORMING LABORATORY: NORMAL
TEST NAME: NORMAL

## 2022-09-16 PROCEDURE — 82785 ASSAY OF IGE: CPT

## 2022-09-16 PROCEDURE — 86003 ALLG SPEC IGE CRUDE XTRC EA: CPT

## 2022-09-16 PROCEDURE — 36415 COLL VENOUS BLD VENIPUNCTURE: CPT

## 2022-09-16 PROCEDURE — 86008 ALLG SPEC IGE RECOMB EA: CPT | Mod: 59

## 2022-09-19 LAB — IGE SERPL-ACNC: 26 KU/L (ref 0–114)

## 2022-09-20 LAB
BERMUDA GRASS IGE QN: <0.1 KU(A)/L
CALIF WALNUT POLN IGE QN: <0.1 KU(A)/L
CAT (RFEL D) 1 IGE QN: <0.1 KU(A)/L
CAT (RFEL D) 4 IGE QN: <0.1 KU(A)/L
CAT SERUM ALB IGE QN: <0.1 KU(A)/L
COCKSFOOT IGE QN: <0.1 KU(A)/L
COMMON RAGWEED IGE QN: <0.1 KU(A)/L
DEPRECATED IGE QN: <0.1 KU(A)/L
DOG DANDER+EPITH IGE QN: <0.1 KU(A)/L
EAST WHITE PINE IGE QN: <0.1 KU(A)/L
FIREBUSH IGE QN: <0.1 KU(A)/L
GOOSEFOOT IGE QN: <0.1 KU(A)/L
MAPLE IGE QN: <0.1 KU(A)/L
MARSH ELDER IGE QN: <0.1 KU(A)/L
MUGWORT IGE QN: <0.1 KU(A)/L
NETTLE IGE QN: <0.1 KU(A)/L
P NOTATUM IGE QN: <0.1 KU(A)/L
RED MULBERRY IGE QN: <0.1 KU(A)/L
SALTWORT IGE QN: <0.1 KU(A)/L
SHEEP SORREL IGE QN: <0.1 KU(A)/L
SILVER BIRCH IGE QN: <0.1 KU(A)/L
TIMOTHY IGE QN: <0.1 KU(A)/L
WHITE MULBERRY IGE QN: <0.1 KU(A)/L
WHITE OAK IGE QN: <0.1 KU(A)/L
WORMWOOD IGE QN: <0.1 KU(A)/L

## 2022-09-21 LAB
A ALTERNATA IGE QN: <0.1 KU(A)/L
A FUMIGATUS IGE QN: <0.1 KU(A)/L
AMER ROACH IGE QN: <0.1 KU(A)/L
C HERBARUM IGE QN: <0.1 KU(A)/L
CAT DANDER IGG QN: <0.1 KU(A)/L
CEDAR IGE QN: <0.1 KU(A)/L
COTTONWOOD IGE QN: <0.1 KU(A)/L
D FARINAE IGE QN: <0.1 KU(A)/L
D PTERONYSS IGE QN: <0.1 KU(A)/L
E PURPURASCENS IGE QN: <0.1 KU(A)/L
ENGL PLANTAIN IGE QN: <0.1 KU(A)/L
GIANT RAGWEED IGE QN: <0.1 KU(A)/L
JOHNSON GRASS IGE QN: <0.1 KU(A)/L
MISCELLANEOUS TEST 1 (ARUP): NORMAL
WHITE ASH IGE QN: <0.1 KU(A)/L
WHITE ELM IGE QN: <0.1 KU(A)/L

## 2022-10-06 ENCOUNTER — MYC MEDICAL ADVICE (OUTPATIENT)
Dept: FAMILY MEDICINE | Facility: CLINIC | Age: 62
End: 2022-10-06

## 2022-10-06 DIAGNOSIS — K44.9 HIATAL HERNIA: Primary | ICD-10-CM

## 2022-10-06 DIAGNOSIS — K21.9 GASTROESOPHAGEAL REFLUX DISEASE, UNSPECIFIED WHETHER ESOPHAGITIS PRESENT: ICD-10-CM

## 2022-10-06 NOTE — CONFIDENTIAL NOTE
[employer] message request for esophogram. See pt message    Kiara Willson RN on 10/6/2022 at 1:29 PM

## 2022-10-08 DIAGNOSIS — K21.9 GASTROESOPHAGEAL REFLUX DISEASE WITHOUT ESOPHAGITIS: ICD-10-CM

## 2022-10-08 RX ORDER — PANTOPRAZOLE SODIUM 40 MG/1
TABLET, DELAYED RELEASE ORAL
Qty: 90 TABLET | Refills: 1 | Status: SHIPPED | OUTPATIENT
Start: 2022-10-08 | End: 2023-04-28

## 2022-10-08 NOTE — TELEPHONE ENCOUNTER
"Last Written Prescription Date:  2/10/22  Last Fill Quantity: 90,  # refills: 2   Last office visit provider:  3/22/22     Requested Prescriptions   Pending Prescriptions Disp Refills     pantoprazole (PROTONIX) 40 MG EC tablet [Pharmacy Med Name: PANTOPRAZOLE TAB 40MG DR] 90 tablet 2     Sig: TAKE 1 TABLET DAILY       PPI Protocol Passed - 10/8/2022  7:14 AM        Passed - Not on Clopidogrel (unless Pantoprazole ordered)        Passed - No diagnosis of osteoporosis on record        Passed - Recent (12 mo) or future (30 days) visit within the authorizing provider's specialty     Patient has had an office visit with the authorizing provider or a provider within the authorizing providers department within the previous 12 mos or has a future within next 30 days. See \"Patient Info\" tab in inbasket, or \"Choose Columns\" in Meds & Orders section of the refill encounter.              Passed - Medication is active on med list        Passed - Patient is age 18 or older             Karen Esqueda RN 10/08/22 6:27 PM  "

## 2022-10-14 ENCOUNTER — HOSPITAL ENCOUNTER (OUTPATIENT)
Dept: RADIOLOGY | Facility: HOSPITAL | Age: 62
Discharge: HOME | End: 2022-10-14
Attending: FAMILY MEDICINE | Admitting: FAMILY MEDICINE
Payer: COMMERCIAL

## 2022-10-14 DIAGNOSIS — K21.9 GASTROESOPHAGEAL REFLUX DISEASE, UNSPECIFIED WHETHER ESOPHAGITIS PRESENT: ICD-10-CM

## 2022-10-14 DIAGNOSIS — K44.9 HIATAL HERNIA: ICD-10-CM

## 2022-10-14 PROCEDURE — 74220 X-RAY XM ESOPHAGUS 1CNTRST: CPT

## 2022-10-23 ENCOUNTER — HEALTH MAINTENANCE LETTER (OUTPATIENT)
Age: 62
End: 2022-10-23

## 2022-11-28 DIAGNOSIS — N52.9 ERECTILE DYSFUNCTION, UNSPECIFIED ERECTILE DYSFUNCTION TYPE: ICD-10-CM

## 2022-11-29 RX ORDER — SILDENAFIL 100 MG/1
TABLET, FILM COATED ORAL
Qty: 30 TABLET | Refills: 3 | Status: SHIPPED | OUTPATIENT
Start: 2022-11-29 | End: 2024-03-18

## 2022-11-29 NOTE — TELEPHONE ENCOUNTER
"Routing refill request to provider for review/approval because:  Needs review    Last Written Prescription Date:  11/10/21  Last Fill Quantity: 30,  # refills: 3   Last office visit provider:  3/22/22     Requested Prescriptions   Pending Prescriptions Disp Refills     sildenafil (VIAGRA) 100 MG tablet [Pharmacy Med Name: SILDENAFIL TAB 100MG] 30 tablet 3     Sig: TAKE 1 TABLET AS NEEDED FORERECTILE DYSFUNCTION       Erectile Dysfuction Protocol Failed - 11/28/2022  1:15 PM        Failed - Absence of Alpha Blockers on Med list        Passed - Absence of nitrates on medication list        Passed - Recent (12 mo) or future (30 days) visit within the authorizing provider's specialty     Patient has had an office visit with the authorizing provider or a provider within the authorizing providers department within the previous 12 mos or has a future within next 30 days. See \"Patient Info\" tab in inbasket, or \"Choose Columns\" in Meds & Orders section of the refill encounter.              Passed - Medication is active on med list        Passed - Patient is age 18 or older             Karen Esqueda RN 11/29/22 2:18 PM  "

## 2022-12-06 DIAGNOSIS — J31.0 CHRONIC RHINITIS: ICD-10-CM

## 2022-12-07 RX ORDER — FLUTICASONE PROPIONATE 50 MCG
SPRAY, SUSPENSION (ML) NASAL
Qty: 48 G | Refills: 1 | Status: SHIPPED | OUTPATIENT
Start: 2022-12-07 | End: 2023-09-10

## 2022-12-07 NOTE — TELEPHONE ENCOUNTER
"Last Written Prescription Date:  12/5/21  Last Fill Quantity: 16 g,  # refills: 11   Last office visit provider:  3/22/22     Requested Prescriptions   Pending Prescriptions Disp Refills     fluticasone (FLONASE) 50 MCG/ACT nasal spray [Pharmacy Med Name: FLUTICASONE  SPR 50MCG RX] 16 g 5     Sig: USE 1 SPRAY IN EACH NOSTRILDAILY       Nasal Allergy Protocol Passed - 12/7/2022  8:11 AM        Passed - Patient is age 12 or older        Passed - Recent (12 mo) or future (30 days) visit within the authorizing provider's specialty     Patient has had an office visit with the authorizing provider or a provider within the authorizing providers department within the previous 12 mos or has a future within next 30 days. See \"Patient Info\" tab in inbasket, or \"Choose Columns\" in Meds & Orders section of the refill encounter.              Passed - Medication is active on med list             Tyson Alegre RN 12/07/22 8:11 AM  "

## 2023-01-30 ENCOUNTER — TRANSFERRED RECORDS (OUTPATIENT)
Dept: HEALTH INFORMATION MANAGEMENT | Facility: CLINIC | Age: 63
End: 2023-01-30

## 2023-03-14 DIAGNOSIS — K21.9 GASTROESOPHAGEAL REFLUX DISEASE WITHOUT ESOPHAGITIS: ICD-10-CM

## 2023-03-14 RX ORDER — SUCRALFATE 1 G/1
TABLET ORAL
Qty: 90 TABLET | Refills: 0 | Status: SHIPPED | OUTPATIENT
Start: 2023-03-14 | End: 2023-06-12

## 2023-03-15 NOTE — TELEPHONE ENCOUNTER
"Last Written Prescription Date:  4/1/22  Last Fill Quantity: 90,  # refills: 3   Last office visit provider:  3/22/22     Requested Prescriptions   Pending Prescriptions Disp Refills     sucralfate (CARAFATE) 1 GM tablet [Pharmacy Med Name: SUCRALFATE TAB 1GM] 90 tablet 3     Sig: TAKE 1 TABLET DAILY       Miscellaneous Gastrointestinal Agents Passed - 3/14/2023  1:15 AM        Passed - Recent (12 mo) or future (30 days) visit within the authorizing provider's specialty     Patient has had an office visit with the authorizing provider or a provider within the authorizing providers department within the previous 12 mos or has a future within next 30 days. See \"Patient Info\" tab in inbasket, or \"Choose Columns\" in Meds & Orders section of the refill encounter.              Passed - Medication is active on med list        Passed - Patient is 18 years of age or older             Karen Esqueda RN 03/14/23 10:10 PM  "

## 2023-04-10 ENCOUNTER — OFFICE VISIT (OUTPATIENT)
Dept: FAMILY MEDICINE | Facility: CLINIC | Age: 63
End: 2023-04-10
Payer: COMMERCIAL

## 2023-04-10 VITALS
TEMPERATURE: 98.2 F | HEIGHT: 68 IN | OXYGEN SATURATION: 98 % | BODY MASS INDEX: 28.37 KG/M2 | SYSTOLIC BLOOD PRESSURE: 139 MMHG | WEIGHT: 187.2 LBS | RESPIRATION RATE: 18 BRPM | HEART RATE: 65 BPM | DIASTOLIC BLOOD PRESSURE: 91 MMHG

## 2023-04-10 DIAGNOSIS — C61 PROSTATE CANCER (H): ICD-10-CM

## 2023-04-10 DIAGNOSIS — K44.9 HIATAL HERNIA: ICD-10-CM

## 2023-04-10 DIAGNOSIS — R03.0 ELEVATED BLOOD PRESSURE READING WITHOUT DIAGNOSIS OF HYPERTENSION: ICD-10-CM

## 2023-04-10 DIAGNOSIS — K21.9 GASTROESOPHAGEAL REFLUX DISEASE WITHOUT ESOPHAGITIS: ICD-10-CM

## 2023-04-10 DIAGNOSIS — Z00.00 ROUTINE GENERAL MEDICAL EXAMINATION AT A HEALTH CARE FACILITY: Primary | ICD-10-CM

## 2023-04-10 PROCEDURE — 99396 PREV VISIT EST AGE 40-64: CPT | Performed by: FAMILY MEDICINE

## 2023-04-10 ASSESSMENT — PAIN SCALES - GENERAL: PAINLEVEL: NO PAIN (0)

## 2023-04-10 NOTE — PROGRESS NOTES
"Bi Bae  BP (!) 139/91   Pulse 65   Temp 98.2  F (36.8  C)   Resp 18   Ht 1.727 m (5' 8\")   Wt 84.9 kg (187 lb 3.2 oz)   SpO2 98%   BMI 28.46 kg/m       Assessment/Plan:                Bi was seen today for physical, hernia and hypertension.    Diagnoses and all orders for this visit:    Routine general medical examination at a health care facility  -     Lipid panel reflex to direct LDL Fasting; Future    Gastroesophageal reflux disease without esophagitis    Hiatal hernia    Prostate cancer (H)    Elevated blood pressure reading without diagnosis of hypertension  -     Basic metabolic panel; Future         DISCUSSION  Return for fasting labs.  Continue to monitor blood pressure.  Continue PPI for management of acid reflux.  Subjective:     HPI:    Bi Bae is a 62 year old male is here today for physical.  He has low-grade prostate cancer he is followed with active surveillance by the [facility] urology department.  We talked about this briefly today.  He has chronic acid reflux symptoms.  His symptoms remain controlled with use of PPI.  We Felt in the past that it is best for him to remain on therapy to keep symptoms controlled at this point in time, we revisited this conversation acknowledging that there are risks of continued PPI use but again feel he is best served by continuing medication therapy.  He does have a known hiatal hernia without any concerns about ongoing symptoms we will not pursue any additional treatment options.  He has been monitoring blood pressure at home.  He has a past history of hypertension for which she was on therapy.  He has not been on medication for some time now.  Blood pressure has crept up slightly on his monitoring.  We discussed a strategy for continued monitoring along with a review of lifestyle modifications.  We discussed other routine health preventive measures.  No other significant concerns were identified today.  We did discuss briefly that " he has an incidentally noted right-sided inguinal hernia it is asymptomatic    ROS:  Complete review of systems is obtained.  Other than the specific considerations noted above complete review of systems is negative.          Objective:   Medications:  Current Outpatient Medications   Medication     acyclovir (ZOVIRAX) 800 MG tablet     ascorbic acid, vitamin C, (VITAMIN C) 1000 MG tablet     cetirizine (ZYRTEC) 10 MG tablet     diazepam (VALIUM) 5 MG tablet     fluticasone (FLONASE) 50 MCG/ACT nasal spray     Multiple Vitamins-Minerals (MULTIVITAMIN ADULTS 50+ PO)     multivitamin therapeutic tablet     pantoprazole (PROTONIX) 40 MG EC tablet     sildenafil (VIAGRA) 100 MG tablet     sucralfate (CARAFATE) 1 GM tablet     tamsulosin (FLOMAX) 0.4 mg cap     zinc sulfate (ZINC-15 ORAL)     No current facility-administered medications for this visit.        Allergies:   No Known Allergies     Social History     Socioeconomic History     Marital status:      Spouse name: Not on file     Number of children: Not on file     Years of education: Not on file     Highest education level: Not on file   Occupational History     Not on file   Tobacco Use     Smoking status: Never     Smokeless tobacco: Former   Vaping Use     Vaping status: Never Used   Substance and Sexual Activity     Alcohol use: Not on file     Drug use: Not on file     Sexual activity: Not on file   Other Topics Concern     Not on file   Social History Narrative     Not on file     Social Determinants of Health     Financial Resource Strain: Not on file   Food Insecurity: Not on file   Transportation Needs: Not on file   Physical Activity: Not on file   Stress: Not on file   Social Connections: Not on file   Intimate Partner Violence: Not on file   Housing Stability: Not on file       No family history on file.     Most Recent Immunizations   Administered Date(s) Administered     DT (PEDS <7y) 01/10/2007     FLU 6-35 months 11/05/2009     Flu,  "Unspecified 10/08/2021     Influenza (IIV3) PF 10/22/2014     Influenza Vaccine >6 months (Alfuria,Fluzone) 10/29/2018     Influenza Vaccine, 6+MO IM (QUADRIVALENT W/PRESERVATIVES) 10/08/2021     Influenza vaccine ages 6-35 months 10/08/2021     Influenza,INJ,MDCK,PF,Quad >6mo(Flucelvax) 10/29/2019     TD,PF 7+ (Tenivac) 07/06/2022     TDAP (Adacel,Boostrix) 09/14/2012     TDAP Vaccine (Boostrix) 09/14/2012     Td (Adult), Adsorbed 01/03/1994     Td,adult,historic,unspecified 01/10/2007     Zoster recombinant adjuvanted (SHINGRIX) 03/18/2020        Wt Readings from Last 3 Encounters:   04/10/23 84.9 kg (187 lb 3.2 oz)   03/22/22 81.7 kg (180 lb 3.2 oz)   11/08/21 84.5 kg (186 lb 3.2 oz)        BP Readings from Last 6 Encounters:   04/10/23 (!) 139/91   03/22/22 130/76   11/08/21 118/76   09/30/21 118/68   09/14/21 118/72   01/25/21 128/74        No results found for: A1C, HEMOGLOBINA1           PHYSICAL EXAM:    BP (!) 139/91   Pulse 65   Temp 98.2  F (36.8  C)   Resp 18   Ht 1.727 m (5' 8\")   Wt 84.9 kg (187 lb 3.2 oz)   SpO2 98%   BMI 28.46 kg/m           General Appearance:    Alert, cooperative, no distress   Eyes:   No scleral icterus or conjunctival irritation       Ears:    Normal TM's and external ear canals, both ears   Throat:   Lips, mucosa, and tongue normal; teeth and gums normal   Neck:   Supple, symmetrical, trachea midline, no adenopathy;        thyroid:  No enlargement/tenderness/nodules   Lungs:     Clear to auscultation bilaterally, respirations unlabored, no wheezes or crackles   Heart:    Regular rate and rhythm,  No murmur   Abdomen:    Soft, no distention, no tenderness on palpation, no masses, no organomegaly     Extremities:  No edema, no joint swelling or redness, no evidence of any injuries   Skin:  No concerning skin findings, no suspicious moles, no rashes   Neurologic:  On gross examination there is no motor or sensory deficit.  Patient walks with a normal gait "                 Answers for HPI/ROS submitted by the patient on 4/4/2023  Frequency of exercise:: 4-5 days/week  Getting at least 3 servings of Calcium per day:: Yes  Diet:: Regular (no restrictions)  Taking medications regularly:: Yes  Medication side effects:: Not applicable  Bi-annual eye exam:: Yes  Dental care twice a year:: Yes  Sleep apnea or symptoms of sleep apnea:: None  Additional concerns today:: No  Duration of exercise:: 30-45 minutes

## 2023-04-25 ENCOUNTER — LAB (OUTPATIENT)
Dept: LAB | Facility: CLINIC | Age: 63
End: 2023-04-25
Payer: COMMERCIAL

## 2023-04-25 DIAGNOSIS — R03.0 ELEVATED BLOOD PRESSURE READING WITHOUT DIAGNOSIS OF HYPERTENSION: ICD-10-CM

## 2023-04-25 DIAGNOSIS — Z00.00 ROUTINE GENERAL MEDICAL EXAMINATION AT A HEALTH CARE FACILITY: ICD-10-CM

## 2023-04-25 LAB
ANION GAP SERPL CALCULATED.3IONS-SCNC: 9 MMOL/L (ref 7–15)
BUN SERPL-MCNC: 20.4 MG/DL (ref 8–23)
CALCIUM SERPL-MCNC: 9.9 MG/DL (ref 8.8–10.2)
CHLORIDE SERPL-SCNC: 104 MMOL/L (ref 98–107)
CHOLEST SERPL-MCNC: 235 MG/DL
CREAT SERPL-MCNC: 1.43 MG/DL (ref 0.67–1.17)
DEPRECATED HCO3 PLAS-SCNC: 27 MMOL/L (ref 22–29)
GFR SERPL CREATININE-BSD FRML MDRD: 56 ML/MIN/1.73M2
GLUCOSE SERPL-MCNC: 99 MG/DL (ref 70–99)
HDLC SERPL-MCNC: 48 MG/DL
HOLD SPECIMEN: NORMAL
LDLC SERPL CALC-MCNC: 154 MG/DL
NONHDLC SERPL-MCNC: 187 MG/DL
POTASSIUM SERPL-SCNC: 4.4 MMOL/L (ref 3.4–5.3)
SODIUM SERPL-SCNC: 140 MMOL/L (ref 136–145)
TRIGL SERPL-MCNC: 163 MG/DL

## 2023-04-25 PROCEDURE — 36415 COLL VENOUS BLD VENIPUNCTURE: CPT

## 2023-04-25 PROCEDURE — 80061 LIPID PANEL: CPT

## 2023-04-25 PROCEDURE — 80048 BASIC METABOLIC PNL TOTAL CA: CPT

## 2023-04-28 DIAGNOSIS — K21.9 GASTROESOPHAGEAL REFLUX DISEASE WITHOUT ESOPHAGITIS: ICD-10-CM

## 2023-04-28 RX ORDER — PANTOPRAZOLE SODIUM 40 MG/1
TABLET, DELAYED RELEASE ORAL
Qty: 90 TABLET | Refills: 3 | Status: SHIPPED | OUTPATIENT
Start: 2023-04-28 | End: 2024-04-15

## 2023-04-28 NOTE — TELEPHONE ENCOUNTER
"Last Written Prescription Date:  10/8/2022  Last Fill Quantity: 90,  # refills: 1   Last office visit provider:  4/10/2023     Requested Prescriptions   Pending Prescriptions Disp Refills     pantoprazole (PROTONIX) 40 MG EC tablet [Pharmacy Med Name: PANTOPRAZOLE TAB 40MG DR] 90 tablet 1     Sig: TAKE 1 TABLET DAILY       PPI Protocol Passed - 4/28/2023  1:51 PM        Passed - Not on Clopidogrel (unless Pantoprazole ordered)        Passed - No diagnosis of osteoporosis on record        Passed - Recent (12 mo) or future (30 days) visit within the authorizing provider's specialty     Patient has had an office visit with the authorizing provider or a provider within the authorizing providers department within the previous 12 mos or has a future within next 30 days. See \"Patient Info\" tab in inbasket, or \"Choose Columns\" in Meds & Orders section of the refill encounter.              Passed - Medication is active on med list        Passed - Patient is age 18 or older             STEW FINNEY RN 04/28/23 1:52 PM  "

## 2023-04-30 DIAGNOSIS — N17.9 AKI (ACUTE KIDNEY INJURY) (H): Primary | ICD-10-CM

## 2023-05-19 ENCOUNTER — HOSPITAL ENCOUNTER (OUTPATIENT)
Dept: CT IMAGING | Facility: CLINIC | Age: 63
Discharge: HOME | End: 2023-05-19
Attending: FAMILY MEDICINE | Admitting: FAMILY MEDICINE
Payer: COMMERCIAL

## 2023-05-19 DIAGNOSIS — E78.5 HYPERLIPIDEMIA LDL GOAL <130: ICD-10-CM

## 2023-05-19 LAB
CV CALCIUM SCORE AGATSTON LM: 0
CV CALCIUM SCORING AGATSON LAD: 0
CV CALCIUM SCORING AGATSTON CX: 0
CV CALCIUM SCORING AGATSTON RCA: 0
CV CALCIUM SCORING AGATSTON TOTAL: 0

## 2023-05-19 PROCEDURE — 75571 CT HRT W/O DYE W/CA TEST: CPT | Mod: 26 | Performed by: INTERNAL MEDICINE

## 2023-05-19 PROCEDURE — 75571 CT HRT W/O DYE W/CA TEST: CPT

## 2023-06-12 DIAGNOSIS — K21.9 GASTROESOPHAGEAL REFLUX DISEASE WITHOUT ESOPHAGITIS: ICD-10-CM

## 2023-06-12 RX ORDER — SUCRALFATE 1 G/1
1 TABLET ORAL DAILY
Qty: 90 TABLET | Refills: 3 | Status: SHIPPED | OUTPATIENT
Start: 2023-06-12 | End: 2024-09-11

## 2023-06-12 NOTE — TELEPHONE ENCOUNTER
"Last Written Prescription Date:  3/14/23  Last Fill Quantity: 90,  # refills: 0   Last office visit provider:  4/10/23     Requested Prescriptions   Pending Prescriptions Disp Refills     sucralfate (CARAFATE) 1 GM tablet [Pharmacy Med Name: SUCRALFATE TAB 1GM] 90 tablet 0     Sig: TAKE 1 TABLET DAILY       Miscellaneous Gastrointestinal Agents Passed - 6/12/2023  1:08 PM        Passed - Recent (12 mo) or future (30 days) visit within the authorizing provider's specialty     Patient has had an office visit with the authorizing provider or a provider within the authorizing providers department within the previous 12 mos or has a future within next 30 days. See \"Patient Info\" tab in inbasket, or \"Choose Columns\" in Meds & Orders section of the refill encounter.              Passed - Medication is active on med list        Passed - Patient is 18 years of age or older             Tyson Alegre RN 06/12/23 1:08 PM  "

## 2023-07-28 ENCOUNTER — LAB (OUTPATIENT)
Dept: LAB | Facility: CLINIC | Age: 63
End: 2023-07-28
Payer: COMMERCIAL

## 2023-07-28 DIAGNOSIS — N17.9 AKI (ACUTE KIDNEY INJURY) (H): ICD-10-CM

## 2023-07-28 LAB
ANION GAP SERPL CALCULATED.3IONS-SCNC: 10 MMOL/L (ref 7–15)
BUN SERPL-MCNC: 10.8 MG/DL (ref 8–23)
CALCIUM SERPL-MCNC: 9.1 MG/DL (ref 8.8–10.2)
CHLORIDE SERPL-SCNC: 105 MMOL/L (ref 98–107)
CREAT SERPL-MCNC: 1.01 MG/DL (ref 0.67–1.17)
DEPRECATED HCO3 PLAS-SCNC: 24 MMOL/L (ref 22–29)
GFR SERPL CREATININE-BSD FRML MDRD: 84 ML/MIN/1.73M2
GLUCOSE SERPL-MCNC: 84 MG/DL (ref 70–99)
POTASSIUM SERPL-SCNC: 4.5 MMOL/L (ref 3.4–5.3)
SODIUM SERPL-SCNC: 139 MMOL/L (ref 136–145)

## 2023-07-28 PROCEDURE — 80048 BASIC METABOLIC PNL TOTAL CA: CPT

## 2023-07-28 PROCEDURE — 36415 COLL VENOUS BLD VENIPUNCTURE: CPT

## 2023-09-10 DIAGNOSIS — J31.0 CHRONIC RHINITIS: ICD-10-CM

## 2023-09-10 RX ORDER — FLUTICASONE PROPIONATE 50 MCG
SPRAY, SUSPENSION (ML) NASAL
Qty: 32 G | Refills: 2 | Status: SHIPPED | OUTPATIENT
Start: 2023-09-10 | End: 2024-05-30

## 2023-09-10 NOTE — TELEPHONE ENCOUNTER
"Routing refill request to provider for review/approval because:  A break in medication    Last Written Prescription Date:  12/7/22  Last Fill Quantity: 48g,  # refills: 1   Last office visit provider:  4/10/23     Requested Prescriptions   Pending Prescriptions Disp Refills    fluticasone (FLONASE) 50 MCG/ACT nasal spray [Pharmacy Med Name: FLUTICASONE  SPR 50MCG RX] 32 g 2     Sig: USE 1 SPRAY IN EACH NOSTRILDAILY       Nasal Allergy Protocol Passed - 9/10/2023  1:29 AM        Passed - Patient is age 12 or older        Passed - Recent (12 mo) or future (30 days) visit within the authorizing provider's specialty     Patient has had an office visit with the authorizing provider or a provider within the authorizing providers department within the previous 12 mos or has a future within next 30 days. See \"Patient Info\" tab in inbasket, or \"Choose Columns\" in Meds & Orders section of the refill encounter.              Passed - Medication is active on med list             Leydi Fitzpatrick RN 09/10/23 1:30 AM  "

## 2023-09-14 DIAGNOSIS — B00.9 HSV INFECTION: ICD-10-CM

## 2023-09-14 RX ORDER — ACYCLOVIR 800 MG/1
800 TABLET ORAL 4 TIMES DAILY
Qty: 20 TABLET | Refills: 3 | Status: SHIPPED | OUTPATIENT
Start: 2023-09-14

## 2023-09-29 ENCOUNTER — TRANSFERRED RECORDS (OUTPATIENT)
Dept: HEALTH INFORMATION MANAGEMENT | Facility: CLINIC | Age: 63
End: 2023-09-29
Payer: COMMERCIAL

## 2023-11-30 ENCOUNTER — APPOINTMENT (OUTPATIENT)
Dept: LAB | Facility: CLINIC | Age: 63
End: 2023-11-30
Payer: COMMERCIAL

## 2024-02-02 ENCOUNTER — TRANSFERRED RECORDS (OUTPATIENT)
Dept: HEALTH INFORMATION MANAGEMENT | Facility: CLINIC | Age: 64
End: 2024-02-02
Payer: COMMERCIAL

## 2024-03-15 ENCOUNTER — LAB (OUTPATIENT)
Dept: LAB | Facility: CLINIC | Age: 64
End: 2024-03-15
Payer: COMMERCIAL

## 2024-03-15 DIAGNOSIS — E78.5 HYPERLIPIDEMIA LDL GOAL <130: ICD-10-CM

## 2024-03-15 DIAGNOSIS — N17.9 AKI (ACUTE KIDNEY INJURY) (H): ICD-10-CM

## 2024-03-15 DIAGNOSIS — C61 PROSTATE CANCER (H): ICD-10-CM

## 2024-03-15 PROCEDURE — 36415 COLL VENOUS BLD VENIPUNCTURE: CPT

## 2024-03-15 PROCEDURE — 80048 BASIC METABOLIC PNL TOTAL CA: CPT

## 2024-03-15 PROCEDURE — 80061 LIPID PANEL: CPT

## 2024-03-16 LAB
ANION GAP SERPL CALCULATED.3IONS-SCNC: 11 MMOL/L (ref 7–15)
BUN SERPL-MCNC: 18.2 MG/DL (ref 8–23)
CALCIUM SERPL-MCNC: 9.4 MG/DL (ref 8.8–10.2)
CHLORIDE SERPL-SCNC: 106 MMOL/L (ref 98–107)
CHOLEST SERPL-MCNC: 205 MG/DL
CREAT SERPL-MCNC: 1.2 MG/DL (ref 0.67–1.17)
DEPRECATED HCO3 PLAS-SCNC: 24 MMOL/L (ref 22–29)
EGFRCR SERPLBLD CKD-EPI 2021: 68 ML/MIN/1.73M2
FASTING STATUS PATIENT QL REPORTED: YES
GLUCOSE SERPL-MCNC: 90 MG/DL (ref 70–99)
HDLC SERPL-MCNC: 43 MG/DL
LDLC SERPL CALC-MCNC: 130 MG/DL
NONHDLC SERPL-MCNC: 162 MG/DL
POTASSIUM SERPL-SCNC: 4.4 MMOL/L (ref 3.4–5.3)
SODIUM SERPL-SCNC: 141 MMOL/L (ref 135–145)
TRIGL SERPL-MCNC: 158 MG/DL

## 2024-03-18 DIAGNOSIS — N52.9 ERECTILE DYSFUNCTION, UNSPECIFIED ERECTILE DYSFUNCTION TYPE: ICD-10-CM

## 2024-03-18 RX ORDER — SILDENAFIL 100 MG/1
TABLET, FILM COATED ORAL
Qty: 30 TABLET | Refills: 3 | Status: SHIPPED | OUTPATIENT
Start: 2024-03-18

## 2024-03-29 PROBLEM — L25.9 CONTACT DERMATITIS, UNSPECIFIED CONTACT DERMATITIS TYPE, UNSPECIFIED TRIGGER: Status: RESOLVED | Noted: 2019-02-13 | Resolved: 2024-03-29

## 2024-03-29 PROBLEM — Z86.0100 HISTORY OF COLONIC POLYPS: Status: ACTIVE | Noted: 2023-10-03

## 2024-03-29 PROBLEM — K44.9 DIAPHRAGMATIC HERNIA: Status: RESOLVED | Noted: 2021-01-28 | Resolved: 2024-03-29

## 2024-03-29 PROBLEM — L50.9 HIVES: Status: RESOLVED | Noted: 2019-02-13 | Resolved: 2024-03-29

## 2024-03-29 PROBLEM — R07.9 CHEST PAIN: Status: RESOLVED | Noted: 2017-03-06 | Resolved: 2024-03-29

## 2024-04-01 ENCOUNTER — PATIENT OUTREACH (OUTPATIENT)
Dept: GASTROENTEROLOGY | Facility: CLINIC | Age: 64
End: 2024-04-01
Payer: COMMERCIAL

## 2024-04-02 SDOH — HEALTH STABILITY: PHYSICAL HEALTH: ON AVERAGE, HOW MANY DAYS PER WEEK DO YOU ENGAGE IN MODERATE TO STRENUOUS EXERCISE (LIKE A BRISK WALK)?: 5 DAYS

## 2024-04-02 SDOH — HEALTH STABILITY: PHYSICAL HEALTH: ON AVERAGE, HOW MANY MINUTES DO YOU ENGAGE IN EXERCISE AT THIS LEVEL?: 30 MIN

## 2024-04-02 ASSESSMENT — SOCIAL DETERMINANTS OF HEALTH (SDOH): HOW OFTEN DO YOU GET TOGETHER WITH FRIENDS OR RELATIVES?: ONCE A WEEK

## 2024-04-05 ENCOUNTER — OFFICE VISIT (OUTPATIENT)
Dept: FAMILY MEDICINE | Facility: CLINIC | Age: 64
End: 2024-04-05
Payer: COMMERCIAL

## 2024-04-05 VITALS
HEIGHT: 68 IN | HEART RATE: 67 BPM | DIASTOLIC BLOOD PRESSURE: 78 MMHG | BODY MASS INDEX: 28.64 KG/M2 | RESPIRATION RATE: 18 BRPM | SYSTOLIC BLOOD PRESSURE: 128 MMHG | WEIGHT: 189 LBS | TEMPERATURE: 98 F | OXYGEN SATURATION: 98 %

## 2024-04-05 DIAGNOSIS — C61 PROSTATE CANCER (H): ICD-10-CM

## 2024-04-05 DIAGNOSIS — N17.9 AKI (ACUTE KIDNEY INJURY) (H): ICD-10-CM

## 2024-04-05 DIAGNOSIS — Z00.00 ROUTINE GENERAL MEDICAL EXAMINATION AT A HEALTH CARE FACILITY: Primary | ICD-10-CM

## 2024-04-05 DIAGNOSIS — D64.9 ANEMIA, UNSPECIFIED TYPE: ICD-10-CM

## 2024-04-05 DIAGNOSIS — N52.9 ERECTILE DYSFUNCTION, UNSPECIFIED ERECTILE DYSFUNCTION TYPE: ICD-10-CM

## 2024-04-05 DIAGNOSIS — E78.5 HYPERLIPIDEMIA LDL GOAL <130: ICD-10-CM

## 2024-04-05 DIAGNOSIS — K40.90 RIGHT INGUINAL HERNIA: ICD-10-CM

## 2024-04-05 DIAGNOSIS — K21.9 GASTROESOPHAGEAL REFLUX DISEASE WITHOUT ESOPHAGITIS: ICD-10-CM

## 2024-04-05 LAB
BASOPHILS # BLD AUTO: 0 10E3/UL (ref 0–0.2)
BASOPHILS NFR BLD AUTO: 0 %
EOSINOPHIL # BLD AUTO: 0.1 10E3/UL (ref 0–0.7)
EOSINOPHIL NFR BLD AUTO: 2 %
ERYTHROCYTE [DISTWIDTH] IN BLOOD BY AUTOMATED COUNT: 12.8 % (ref 10–15)
HCT VFR BLD AUTO: 36.6 % (ref 40–53)
HGB BLD-MCNC: 12.9 G/DL (ref 13.3–17.7)
IMM GRANULOCYTES # BLD: 0 10E3/UL
IMM GRANULOCYTES NFR BLD: 0 %
LYMPHOCYTES # BLD AUTO: 1.7 10E3/UL (ref 0.8–5.3)
LYMPHOCYTES NFR BLD AUTO: 28 %
MCH RBC QN AUTO: 32.1 PG (ref 26.5–33)
MCHC RBC AUTO-ENTMCNC: 35.2 G/DL (ref 31.5–36.5)
MCV RBC AUTO: 91 FL (ref 78–100)
MONOCYTES # BLD AUTO: 0.7 10E3/UL (ref 0–1.3)
MONOCYTES NFR BLD AUTO: 11 %
NEUTROPHILS # BLD AUTO: 3.6 10E3/UL (ref 1.6–8.3)
NEUTROPHILS NFR BLD AUTO: 59 %
PLATELET # BLD AUTO: 207 10E3/UL (ref 150–450)
RBC # BLD AUTO: 4.02 10E6/UL (ref 4.4–5.9)
WBC # BLD AUTO: 6.1 10E3/UL (ref 4–11)

## 2024-04-05 PROCEDURE — 83550 IRON BINDING TEST: CPT | Performed by: FAMILY MEDICINE

## 2024-04-05 PROCEDURE — 83540 ASSAY OF IRON: CPT | Performed by: FAMILY MEDICINE

## 2024-04-05 PROCEDURE — 99396 PREV VISIT EST AGE 40-64: CPT | Performed by: FAMILY MEDICINE

## 2024-04-05 PROCEDURE — 36415 COLL VENOUS BLD VENIPUNCTURE: CPT | Performed by: FAMILY MEDICINE

## 2024-04-05 PROCEDURE — 85025 COMPLETE CBC W/AUTO DIFF WBC: CPT | Performed by: FAMILY MEDICINE

## 2024-04-05 PROCEDURE — 82607 VITAMIN B-12: CPT | Performed by: FAMILY MEDICINE

## 2024-04-05 PROCEDURE — 82728 ASSAY OF FERRITIN: CPT | Performed by: FAMILY MEDICINE

## 2024-04-05 PROCEDURE — 84443 ASSAY THYROID STIM HORMONE: CPT | Performed by: FAMILY MEDICINE

## 2024-04-05 PROCEDURE — 80053 COMPREHEN METABOLIC PANEL: CPT | Performed by: FAMILY MEDICINE

## 2024-04-05 RX ORDER — TADALAFIL 20 MG/1
20 TABLET ORAL EVERY 24 HOURS
Qty: 90 TABLET | Refills: 3 | Status: SHIPPED | OUTPATIENT
Start: 2024-04-05 | End: 2024-07-01

## 2024-04-05 ASSESSMENT — PAIN SCALES - GENERAL: PAINLEVEL: NO PAIN (0)

## 2024-04-05 NOTE — PROGRESS NOTES
"Bi Bae  /78   Pulse 67   Temp 98  F (36.7  C)   Resp 18   Ht 1.727 m (5' 8\")   Wt 85.7 kg (189 lb)   SpO2 98%   BMI 28.74 kg/m       Assessment/Plan:                Josafat was seen today for recheck medication, physical and hernia.    Diagnoses and all orders for this visit:    Routine general medical examination at a health care facility    Erectile dysfunction, unspecified erectile dysfunction type  -     tadalafil (ADCIRCA/CIALIS) 20 MG tablet; Take 1 tablet (20 mg) by mouth every 24 hours  -     TSH with free T4 reflex; Future  -     TSH with free T4 reflex    Hyperlipidemia LDL goal <130    DARREN (acute kidney injury) (H24)  -     Comprehensive metabolic panel; Future  -     Comprehensive metabolic panel    Prostate cancer (H)    Gastroesophageal reflux disease without esophagitis  -     CBC with Platelets & Differential; Future  -     Comprehensive metabolic panel; Future  -     CBC with Platelets & Differential  -     Comprehensive metabolic panel    Hiatal hernia    Right inguinal hernia  -     Adult General Surg Referral; Future    Anemia, unspecified type  -     Vitamin B12  -     Iron and iron binding capacity  -     Ferritin         DISCUSSION  See discussion below.  Obtain labs as above.  Referral to general surgery to discuss repair of hernia as discussed below.  Subjective:     HPI:    Bi Bae is a 63 year old male is here today for physical.    His low-grade prostate cancer follows at the [facility].  They are following PSA test every 6 months he will undergo a prostate MRI probably in October 2024.  Reports no concerns in this regard briefly reviewed consultation.    He has a history of acid reflux.  He has had evaluation he reports no symptom concerns takes medication currently.    He has erectile dysfunction.  Has used sildenafil with success, 100 mg sildenafil seems to be becoming less effective.  Discussed things that may potentially be contributing factors and " discussed obtaining some lab tests today.  Discussed making a change to a different type of erectile dysfunction medication to see if it works better.    Laboratory testing has shown some mild elevation in creatinine without significant reduction in GFR we will continue to monitor.    He has hyperlipidemia.  His calculated vascular disease risk would warrant consideration of risk lowering with medication however we have obtained a coronary calcium score based on this information he prefers to hold off on medication therapy which I think is reasonable because his score was 0.  We acknowledge that this is a low risk situation but does not confer low risk.    He has a right-sided inguinal hernia wants to consider aspects of repair will refer to general surgery to discuss.    ROS:  Complete review of systems is obtained.  Other than the specific considerations noted above complete review of systems is negative.          Objective:   Medications:  Current Outpatient Medications   Medication Sig Dispense Refill    acyclovir (ZOVIRAX) 800 MG tablet TAKE 1 TABLET BY MOUTH FOUR TIMES DAILY 20 tablet 3    ascorbic acid, vitamin C, (VITAMIN C) 1000 MG tablet [ASCORBIC ACID, VITAMIN C, (VITAMIN C) 1000 MG TABLET] Take 1,000 mg by mouth daily.      cetirizine (ZYRTEC) 10 MG tablet [CETIRIZINE (ZYRTEC) 10 MG TABLET] Take 10 mg by mouth 2 (two) times a day.      fluticasone (FLONASE) 50 MCG/ACT nasal spray USE 1 SPRAY IN EACH NOSTRILDAILY 32 g 2    Multiple Vitamins-Minerals (MULTIVITAMIN ADULTS 50+ PO)       pantoprazole (PROTONIX) 40 MG EC tablet TAKE 1 TABLET DAILY 90 tablet 3    sildenafil (VIAGRA) 100 MG tablet TAKE 1 TABLET AS NEEDED FORERECTILE DYSFUNCTION 30 tablet 3    sucralfate (CARAFATE) 1 GM tablet Take 1 tablet (1 g) by mouth daily 90 tablet 3    tadalafil (ADCIRCA/CIALIS) 20 MG tablet Take 1 tablet (20 mg) by mouth every 24 hours 90 tablet 3    tamsulosin (FLOMAX) 0.4 mg cap [TAMSULOSIN (FLOMAX) 0.4 MG CAP]        zinc sulfate (ZINC-15 ORAL) [ZINC SULFATE (ZINC-15 ORAL)] Take by mouth.      diazepam (VALIUM) 5 MG tablet TAKE 1 TABLET BY MOUTH AS NEEDED FOR ANXIETY PRIOR TO MRI (Patient not taking: Reported on 4/5/2024)      multivitamin therapeutic tablet [MULTIVITAMIN THERAPEUTIC TABLET] Take 1 tablet by mouth daily. (Patient not taking: Reported on 4/5/2024)       No current facility-administered medications for this visit.        Allergies:   No Known Allergies     Social History     Socioeconomic History    Marital status:      Spouse name: Not on file    Number of children: Not on file    Years of education: Not on file    Highest education level: Not on file   Occupational History    Not on file   Tobacco Use    Smoking status: Never    Smokeless tobacco: Former   Vaping Use    Vaping Use: Never used   Substance and Sexual Activity    Alcohol use: Not on file    Drug use: Not on file    Sexual activity: Not on file   Other Topics Concern    Not on file   Social History Narrative    Not on file     Social Determinants of Health     Financial Resource Strain: Low Risk  (4/2/2024)    Financial Resource Strain     Within the past 12 months, have you or your family members you live with been unable to get utilities (heat, electricity) when it was really needed?: No   Food Insecurity: Low Risk  (4/2/2024)    Food Insecurity     Within the past 12 months, did you worry that your food would run out before you got money to buy more?: No     Within the past 12 months, did the food you bought just not last and you didn t have money to get more?: No   Transportation Needs: Low Risk  (4/2/2024)    Transportation Needs     Within the past 12 months, has lack of transportation kept you from medical appointments, getting your medicines, non-medical meetings or appointments, work, or from getting things that you need?: No   Physical Activity: Sufficiently Active (4/2/2024)    Exercise Vital Sign     Days of Exercise per Week: 5  days     Minutes of Exercise per Session: 30 min   Stress: No Stress Concern Present (4/2/2024)    [place] citizen [employer] - Occupational Stress Questionnaire     Feeling of Stress : Only a little   Social Connections: Unknown (4/2/2024)    Social Connection and Isolation Panel [NHANES]     Frequency of Communication with Friends and Family: Not on file     Frequency of Social Gatherings with Friends and Family: Once a week     Attends [employer] Services: Not on file     Active Member of Clubs or Organizations: Not on file     Attends Club or Organization Meetings: Not on file     Marital Status: Not on file   Interpersonal Safety: Low Risk  (4/5/2024)    Interpersonal Safety     Do you feel physically and emotionally safe where you currently live?: Yes     Within the past 12 months, have you been hit, slapped, kicked or otherwise physically hurt by someone?: No     Within the past 12 months, have you been humiliated or emotionally abused in other ways by your partner or ex-partner?: No   Housing Stability: Low Risk  (4/2/2024)    Housing Stability     Do you have housing? : Yes     Are you worried about losing your housing?: No       No family history on file.     Most Recent Immunizations   Administered Date(s) Administered    DT (PEDS <7y) 01/10/2007    Flu, Unspecified 10/08/2021    Influenza (IIV3) PF 10/22/2014    Influenza Vaccine >6 months,quad, PF 10/17/2023    Influenza Vaccine, 6+MO IM (QUADRIVALENT W/PRESERVATIVES) 10/08/2021    Influenza vaccine ages 6-35 months 10/08/2021    Influenza, seasonal, injectable, PF 11/05/2009    Influenza,INJ,MDCK,PF,Quad >6mo(Flucelvax) 10/29/2019    TD,PF 7+ (Tenivac) 07/06/2022    TDAP (Adacel,Boostrix) 09/14/2012    TDAP Vaccine (Boostrix) 09/14/2012    Td (Adult), Adsorbed 01/03/1994    Td,adult,historic,unspecified 01/10/2007    Zoster recombinant adjuvanted (SHINGRIX) 03/18/2020        Wt Readings from Last 3 Encounters:   04/05/24 85.7 kg (189 lb)  "  04/10/23 84.9 kg (187 lb 3.2 oz)   03/22/22 81.7 kg (180 lb 3.2 oz)        BP Readings from Last 6 Encounters:   04/05/24 128/78   04/10/23 (!) 139/91   03/22/22 130/76   11/08/21 118/76   09/30/21 118/68   09/14/21 118/72      The 10-year ASCVD risk score (Miquel MACKENZIE, et al., 2019) is: 11.4%    Values used to calculate the score:      Age: 63 years      Sex: Male      Is Non- : No      Diabetic: No      Tobacco smoker: No      Systolic Blood Pressure: 128 mmHg      Is BP treated: No      HDL Cholesterol: 43 mg/dL      Total Cholesterol: 205 mg/dL      PHYSICAL EXAM:    /78   Pulse 67   Temp 98  F (36.7  C)   Resp 18   Ht 1.727 m (5' 8\")   Wt 85.7 kg (189 lb)   SpO2 98%   BMI 28.74 kg/m           General Appearance:    Alert, cooperative, no distress   Eyes:   No scleral icterus or conjunctival irritation       Ears:    Normal TM's and external ear canals, both ears   Throat:   Lips, mucosa, and tongue normal; teeth and gums normal   Neck:   Supple, symmetrical, trachea midline, no adenopathy;        thyroid:  No enlargement/tenderness/nodules   Lungs:     Clear to auscultation bilaterally, respirations unlabored, no wheezes or crackles   Heart:    Regular rate and rhythm,  No murmur   Abdomen:    Soft, no distention, no tenderness on palpation, no masses, no organomegaly     Extremities:  No edema, no joint swelling or redness, no evidence of any injuries   Skin:  No concerning skin findings, no suspicious moles, no rashes   Neurologic:  On gross examination there is no motor or sensory deficit.  Patient walks with a normal gait                                     "

## 2024-04-06 LAB
ALBUMIN SERPL BCG-MCNC: 4.4 G/DL (ref 3.5–5.2)
ALP SERPL-CCNC: 51 U/L (ref 40–150)
ALT SERPL W P-5'-P-CCNC: 22 U/L (ref 0–70)
ANION GAP SERPL CALCULATED.3IONS-SCNC: 9 MMOL/L (ref 7–15)
AST SERPL W P-5'-P-CCNC: 23 U/L (ref 0–45)
BILIRUB SERPL-MCNC: 0.4 MG/DL
BUN SERPL-MCNC: 16.1 MG/DL (ref 8–23)
CALCIUM SERPL-MCNC: 9.3 MG/DL (ref 8.8–10.2)
CHLORIDE SERPL-SCNC: 107 MMOL/L (ref 98–107)
CREAT SERPL-MCNC: 1.13 MG/DL (ref 0.67–1.17)
DEPRECATED HCO3 PLAS-SCNC: 25 MMOL/L (ref 22–29)
EGFRCR SERPLBLD CKD-EPI 2021: 73 ML/MIN/1.73M2
FERRITIN SERPL-MCNC: 73 NG/ML (ref 31–409)
GLUCOSE SERPL-MCNC: 99 MG/DL (ref 70–99)
IRON BINDING CAPACITY (ROCHE): 248 UG/DL (ref 240–430)
IRON SATN MFR SERPL: 30 % (ref 15–46)
IRON SERPL-MCNC: 74 UG/DL (ref 61–157)
POTASSIUM SERPL-SCNC: 4.4 MMOL/L (ref 3.4–5.3)
PROT SERPL-MCNC: 7 G/DL (ref 6.4–8.3)
SODIUM SERPL-SCNC: 141 MMOL/L (ref 135–145)
TSH SERPL DL<=0.005 MIU/L-ACNC: 2.26 UIU/ML (ref 0.3–4.2)
VIT B12 SERPL-MCNC: 599 PG/ML (ref 232–1245)

## 2024-04-15 DIAGNOSIS — K21.9 GASTROESOPHAGEAL REFLUX DISEASE WITHOUT ESOPHAGITIS: ICD-10-CM

## 2024-04-15 RX ORDER — PANTOPRAZOLE SODIUM 40 MG/1
TABLET, DELAYED RELEASE ORAL
Qty: 90 TABLET | Refills: 2 | Status: SHIPPED | OUTPATIENT
Start: 2024-04-15

## 2024-05-01 ENCOUNTER — OFFICE VISIT (OUTPATIENT)
Dept: SURGERY | Facility: CLINIC | Age: 64
End: 2024-05-01
Attending: FAMILY MEDICINE
Payer: COMMERCIAL

## 2024-05-01 VITALS — WEIGHT: 189 LBS | HEIGHT: 68 IN | BODY MASS INDEX: 28.64 KG/M2

## 2024-05-01 DIAGNOSIS — K40.90 RIGHT INGUINAL HERNIA: ICD-10-CM

## 2024-05-01 PROCEDURE — 99203 OFFICE O/P NEW LOW 30 MIN: CPT | Performed by: SURGERY

## 2024-05-01 NOTE — LETTER
5/1/2024         RE: Bi Bae  [address] [place] [id number]        Dear Colleague,    Thank you for referring your patient, Bi Bae, to the [facility] [place]. Please see a copy of my visit note below.    HPI:  Bi Bae is a 63 year old male who was referred to me by Neftali Coleman for an inguinal hernia. He  presents today with a recent finding of a possible left inguinal hernia with fat only.  He has never had any symptoms, denies any bulges or any other discomfort.  He had an MRI recently for prostate surveillance and it was noted of the above findings.  Currently he is doing well and has no symptoms whatsoever.        Allergies:Patient has no known allergies.    Past Medical History:   Diagnosis Date     Tinnitus        No past surgical history on file.    CURRENT MEDS:  Current Outpatient Medications   Medication Sig Dispense Refill     acyclovir (ZOVIRAX) 800 MG tablet TAKE 1 TABLET BY MOUTH FOUR TIMES DAILY 20 tablet 3     ascorbic acid, vitamin C, (VITAMIN C) 1000 MG tablet [ASCORBIC ACID, VITAMIN C, (VITAMIN C) 1000 MG TABLET] Take 1,000 mg by mouth daily.       cetirizine (ZYRTEC) 10 MG tablet [CETIRIZINE (ZYRTEC) 10 MG TABLET] Take 10 mg by mouth 2 (two) times a day.       diazepam (VALIUM) 5 MG tablet TAKE 1 TABLET BY MOUTH AS NEEDED FOR ANXIETY PRIOR TO MRI (Patient not taking: Reported on 4/5/2024)       fluticasone (FLONASE) 50 MCG/ACT nasal spray USE 1 SPRAY IN EACH NOSTRILDAILY 32 g 2     Multiple Vitamins-Minerals (MULTIVITAMIN ADULTS 50+ PO)        multivitamin therapeutic tablet [MULTIVITAMIN THERAPEUTIC TABLET] Take 1 tablet by mouth daily. (Patient not taking: Reported on 4/5/2024)       pantoprazole (PROTONIX) 40 MG EC tablet TAKE 1 TABLET DAILY 90 tablet 2     sildenafil (VIAGRA) 100 MG tablet TAKE 1 TABLET AS NEEDED FORERECTILE DYSFUNCTION 30 tablet 3     sucralfate (CARAFATE) 1 GM tablet Take 1 tablet (1 g) by mouth  "daily 90 tablet 3     tadalafil (ADCIRCA/CIALIS) 20 MG tablet Take 1 tablet (20 mg) by mouth every 24 hours 90 tablet 3     tamsulosin (FLOMAX) 0.4 mg cap [TAMSULOSIN (FLOMAX) 0.4 MG CAP]        zinc sulfate (ZINC-15 ORAL) [ZINC SULFATE (ZINC-15 ORAL)] Take by mouth.         Family history-reviewed and  non-contributory     reports that he has never smoked. He has quit using smokeless tobacco.    Review of Systems -   The 12 system review is within normal limits except for as mentioned above.  General ROS: No complaints or constitutional symptoms  Ophthalmic ROS: No complaints of visual changes  Skin: No complaints or symptoms   Endocrine: No complaints or symptoms  Hematologic/Lymphatic: No symptoms or complaints  Psychiatric: No symptoms or complaints  Respiratory ROS: no cough, shortness of breath, or wheezing  Cardiovascular ROS: no chest pain or dyspnea on exertion  Gastrointestinal ROS: As per HPI  Genito-Urinary ROS: no dysuria, trouble voiding, or hematuria  Musculoskeletal ROS: no joint or muscle pain  Neurological ROS: no TIA or stroke symptoms    Ht 1.727 m (5' 8\")   Wt 85.7 kg (189 lb)   BMI 28.74 kg/m    Body mass index is 28.74 kg/m .    EXAM:  GENERAL: Well developed male, No acute distress, pleasant and conversant   EYES: Pupils equal, round and reactive, no scleral icterus  ABDOMEN: Soft, slight prominence of the left inguinal canal consistent with MRI findings no evidence of right inguinal hernia  SKIN: Pink, warm and dry, no obvious rashes or lesions   NEURO:No focal deficits, ambulatory  MUSCULOSKELETAL:No obvious deformities, no swelling, normal appearing          IMAGES:   Relevant images were reviewed and discussed with the patient.  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  I have discussed the pathophysiology of inguinal " hernias at length as well as the  surgical and non-operative management strategies.      The risks of Robotic, Laparoscopic and open inguinal hernia  surgery were explained in detail which include, but are not limited to, bleeding, infection of the mesh, recurrence of the hernia, chronic pain, poor cosmesis, the need for reoperative intervention, the possibility of conversion from a laparoscopic approach to an open approach, subcutaneous emphysema, injury to vital structures,  blood clots, heart attack, stroke and death.  Additionally, the risks of observation were also discussed in detail which include, but are not limited to, chronic pain, enlargement of the hernia, incarceration, strangulation and death.      He understands everything that was discussed.  At this point my recommendations are for surveillance only.  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  If it anytime he has any symptoms he will follow-up with me for an ongoing discussion regarding hernia repair.      Roberto Vela DO [facility]  [phone]      Again, thank you for allowing me to participate in the care of your patient.        Sincerely,        Lewis Vela DO

## 2024-05-01 NOTE — PROGRESS NOTES
HPI:  Bi Bae is a 63 year old male who was referred to me by Neftali Coleman for an inguinal hernia. He  presents today with a recent finding of a possible left inguinal hernia with fat only.  He has never had any symptoms, denies any bulges or any other discomfort.  He had an MRI recently for prostate surveillance and it was noted of the above findings.  Currently he is doing well and has no symptoms whatsoever.        Allergies:Patient has no known allergies.    Past Medical History:   Diagnosis Date    Tinnitus        No past surgical history on file.    CURRENT MEDS:  Current Outpatient Medications   Medication Sig Dispense Refill    acyclovir (ZOVIRAX) 800 MG tablet TAKE 1 TABLET BY MOUTH FOUR TIMES DAILY 20 tablet 3    ascorbic acid, vitamin C, (VITAMIN C) 1000 MG tablet [ASCORBIC ACID, VITAMIN C, (VITAMIN C) 1000 MG TABLET] Take 1,000 mg by mouth daily.      cetirizine (ZYRTEC) 10 MG tablet [CETIRIZINE (ZYRTEC) 10 MG TABLET] Take 10 mg by mouth 2 (two) times a day.      diazepam (VALIUM) 5 MG tablet TAKE 1 TABLET BY MOUTH AS NEEDED FOR ANXIETY PRIOR TO MRI (Patient not taking: Reported on 4/5/2024)      fluticasone (FLONASE) 50 MCG/ACT nasal spray USE 1 SPRAY IN EACH NOSTRILDAILY 32 g 2    Multiple Vitamins-Minerals (MULTIVITAMIN ADULTS 50+ PO)       multivitamin therapeutic tablet [MULTIVITAMIN THERAPEUTIC TABLET] Take 1 tablet by mouth daily. (Patient not taking: Reported on 4/5/2024)      pantoprazole (PROTONIX) 40 MG EC tablet TAKE 1 TABLET DAILY 90 tablet 2    sildenafil (VIAGRA) 100 MG tablet TAKE 1 TABLET AS NEEDED FORERECTILE DYSFUNCTION 30 tablet 3    sucralfate (CARAFATE) 1 GM tablet Take 1 tablet (1 g) by mouth daily 90 tablet 3    tadalafil (ADCIRCA/CIALIS) 20 MG tablet Take 1 tablet (20 mg) by mouth every 24 hours 90 tablet 3    tamsulosin (FLOMAX) 0.4 mg cap [TAMSULOSIN (FLOMAX) 0.4 MG CAP]       zinc sulfate (ZINC-15 ORAL) [ZINC SULFATE (ZINC-15 ORAL)] Take by mouth.         Family  "history-reviewed and  non-contributory     reports that he has never smoked. He has quit using smokeless tobacco.    Review of Systems -   The 12 system review is within normal limits except for as mentioned above.  General ROS: No complaints or constitutional symptoms  Ophthalmic ROS: No complaints of visual changes  Skin: No complaints or symptoms   Endocrine: No complaints or symptoms  Hematologic/Lymphatic: No symptoms or complaints  Psychiatric: No symptoms or complaints  Respiratory ROS: no cough, shortness of breath, or wheezing  Cardiovascular ROS: no chest pain or dyspnea on exertion  Gastrointestinal ROS: As per HPI  Genito-Urinary ROS: no dysuria, trouble voiding, or hematuria  Musculoskeletal ROS: no joint or muscle pain  Neurological ROS: no TIA or stroke symptoms    Ht 1.727 m (5' 8\")   Wt 85.7 kg (189 lb)   BMI 28.74 kg/m    Body mass index is 28.74 kg/m .    EXAM:  GENERAL: Well developed male, No acute distress, pleasant and conversant   EYES: Pupils equal, round and reactive, no scleral icterus  ABDOMEN: Soft, slight prominence of the left inguinal canal consistent with MRI findings no evidence of right inguinal hernia  SKIN: Pink, warm and dry, no obvious rashes or lesions   NEURO:No focal deficits, ambulatory  MUSCULOSKELETAL:No obvious deformities, no swelling, normal appearing          IMAGES:   Relevant images were reviewed and discussed with the patient.  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  I have discussed the pathophysiology of inguinal hernias at length as well as the  surgical and non-operative management strategies.      The risks of Robotic, Laparoscopic and open inguinal hernia  surgery were explained in detail which include, but are not limited to, bleeding, infection of the mesh, recurrence of the hernia, " chronic pain, poor cosmesis, the need for reoperative intervention, the possibility of conversion from a laparoscopic approach to an open approach, subcutaneous emphysema, injury to vital structures,  blood clots, heart attack, stroke and death.  Additionally, the risks of observation were also discussed in detail which include, but are not limited to, chronic pain, enlargement of the hernia, incarceration, strangulation and death.      He understands everything that was discussed.  At this point my recommendations are for surveillance only.  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  If it anytime he has any symptoms he will follow-up with me for an ongoing discussion regarding hernia repair.      Roberto Vela [facility] Surgery  [phone]

## 2024-05-30 DIAGNOSIS — J31.0 CHRONIC RHINITIS: ICD-10-CM

## 2024-05-30 RX ORDER — FLUTICASONE PROPIONATE 50 MCG
SPRAY, SUSPENSION (ML) NASAL
Qty: 32 G | Refills: 2 | Status: SHIPPED | OUTPATIENT
Start: 2024-05-30

## 2024-05-30 NOTE — TELEPHONE ENCOUNTER
Prescription approved per [facility] Refill Protocol.  Henny Carter RN  [facility]

## 2024-07-01 ENCOUNTER — MYC REFILL (OUTPATIENT)
Dept: FAMILY MEDICINE | Facility: CLINIC | Age: 64
End: 2024-07-01
Payer: COMMERCIAL

## 2024-07-01 DIAGNOSIS — N52.9 ERECTILE DYSFUNCTION, UNSPECIFIED ERECTILE DYSFUNCTION TYPE: ICD-10-CM

## 2024-07-01 RX ORDER — TADALAFIL 20 MG/1
20 TABLET ORAL EVERY 24 HOURS
Qty: 90 TABLET | Refills: 2 | Status: SHIPPED | OUTPATIENT
Start: 2024-07-01 | End: 2024-07-03

## 2024-07-01 RX ORDER — TADALAFIL 20 MG/1
20 TABLET ORAL EVERY 24 HOURS
Qty: 90 TABLET | Refills: 3 | Status: CANCELLED | OUTPATIENT
Start: 2024-07-01

## 2024-07-02 DIAGNOSIS — N52.9 ERECTILE DYSFUNCTION, UNSPECIFIED ERECTILE DYSFUNCTION TYPE: ICD-10-CM

## 2024-07-02 RX ORDER — TADALAFIL 20 MG/1
TABLET ORAL
Qty: 90 TABLET | Refills: 2 | OUTPATIENT
Start: 2024-07-02

## 2024-07-03 RX ORDER — TADALAFIL 20 MG/1
TABLET ORAL
Qty: 90 TABLET | Refills: 2 | OUTPATIENT
Start: 2024-07-03

## 2024-07-03 RX ORDER — TADALAFIL 20 MG/1
20 TABLET ORAL EVERY 24 HOURS
Qty: 90 TABLET | Refills: 2 | Status: SHIPPED | OUTPATIENT
Start: 2024-07-03 | End: 2024-07-08

## 2024-07-08 DIAGNOSIS — N52.9 ERECTILE DYSFUNCTION, UNSPECIFIED ERECTILE DYSFUNCTION TYPE: ICD-10-CM

## 2024-07-08 RX ORDER — TADALAFIL 20 MG/1
TABLET ORAL
Qty: 90 TABLET | Refills: 2 | Status: SHIPPED | OUTPATIENT
Start: 2024-07-08

## 2024-09-11 DIAGNOSIS — K21.9 GASTROESOPHAGEAL REFLUX DISEASE WITHOUT ESOPHAGITIS: ICD-10-CM

## 2024-09-11 RX ORDER — SUCRALFATE 1 G/1
1 TABLET ORAL DAILY
Qty: 90 TABLET | Refills: 3 | Status: SHIPPED | OUTPATIENT
Start: 2024-09-11

## 2024-09-13 ENCOUNTER — TELEPHONE (OUTPATIENT)
Dept: FAMILY MEDICINE | Facility: CLINIC | Age: 64
End: 2024-09-13
Payer: COMMERCIAL

## 2024-09-13 DIAGNOSIS — D64.9 ANEMIA, UNSPECIFIED TYPE: Primary | ICD-10-CM

## 2024-09-24 ENCOUNTER — LAB (OUTPATIENT)
Dept: LAB | Facility: CLINIC | Age: 64
End: 2024-09-24
Payer: COMMERCIAL

## 2024-09-24 DIAGNOSIS — Z00.00 HEALTH CARE MAINTENANCE: Primary | ICD-10-CM

## 2024-09-24 DIAGNOSIS — D64.9 ANEMIA, UNSPECIFIED TYPE: ICD-10-CM

## 2024-09-24 LAB
BASOPHILS # BLD AUTO: 0 10E3/UL (ref 0–0.2)
BASOPHILS NFR BLD AUTO: 0 %
EOSINOPHIL # BLD AUTO: 0.1 10E3/UL (ref 0–0.7)
EOSINOPHIL NFR BLD AUTO: 3 %
ERYTHROCYTE [DISTWIDTH] IN BLOOD BY AUTOMATED COUNT: 13 % (ref 10–15)
HCT VFR BLD AUTO: 40.4 % (ref 40–53)
HGB BLD-MCNC: 14.3 G/DL (ref 13.3–17.7)
HOLD SPECIMEN: NORMAL
IMM GRANULOCYTES # BLD: 0 10E3/UL
IMM GRANULOCYTES NFR BLD: 0 %
LYMPHOCYTES # BLD AUTO: 1.6 10E3/UL (ref 0.8–5.3)
LYMPHOCYTES NFR BLD AUTO: 33 %
MCH RBC QN AUTO: 32.9 PG (ref 26.5–33)
MCHC RBC AUTO-ENTMCNC: 35.4 G/DL (ref 31.5–36.5)
MCV RBC AUTO: 93 FL (ref 78–100)
MONOCYTES # BLD AUTO: 0.5 10E3/UL (ref 0–1.3)
MONOCYTES NFR BLD AUTO: 10 %
NEUTROPHILS # BLD AUTO: 2.6 10E3/UL (ref 1.6–8.3)
NEUTROPHILS NFR BLD AUTO: 54 %
PLATELET # BLD AUTO: 216 10E3/UL (ref 150–450)
RBC # BLD AUTO: 4.34 10E6/UL (ref 4.4–5.9)
WBC # BLD AUTO: 4.8 10E3/UL (ref 4–11)

## 2024-09-24 PROCEDURE — 36415 COLL VENOUS BLD VENIPUNCTURE: CPT

## 2024-09-24 PROCEDURE — 85025 COMPLETE CBC W/AUTO DIFF WBC: CPT

## 2024-11-26 DIAGNOSIS — K21.9 GASTROESOPHAGEAL REFLUX DISEASE WITHOUT ESOPHAGITIS: ICD-10-CM

## 2024-11-26 RX ORDER — PANTOPRAZOLE SODIUM 40 MG/1
TABLET, DELAYED RELEASE ORAL
Qty: 90 TABLET | Refills: 2 | OUTPATIENT
Start: 2024-11-26

## 2024-11-27 ENCOUNTER — MYC REFILL (OUTPATIENT)
Dept: FAMILY MEDICINE | Facility: CLINIC | Age: 64
End: 2024-11-27
Payer: COMMERCIAL

## 2024-11-27 DIAGNOSIS — K21.9 GASTROESOPHAGEAL REFLUX DISEASE WITHOUT ESOPHAGITIS: ICD-10-CM

## 2024-11-27 RX ORDER — PANTOPRAZOLE SODIUM 40 MG/1
40 TABLET, DELAYED RELEASE ORAL DAILY
Qty: 90 TABLET | Refills: 2 | Status: SHIPPED | OUTPATIENT
Start: 2024-11-27

## 2025-02-17 DIAGNOSIS — J31.0 CHRONIC RHINITIS: ICD-10-CM

## 2025-02-17 RX ORDER — FLUTICASONE PROPIONATE 50 MCG
SPRAY, SUSPENSION (ML) NASAL
Qty: 32 G | Refills: 1 | Status: SHIPPED | OUTPATIENT
Start: 2025-02-17

## 2025-03-31 ENCOUNTER — MYC MEDICAL ADVICE (OUTPATIENT)
Dept: FAMILY MEDICINE | Facility: CLINIC | Age: 65
End: 2025-03-31
Payer: COMMERCIAL

## 2025-03-31 DIAGNOSIS — D64.9 ANEMIA, UNSPECIFIED TYPE: ICD-10-CM

## 2025-03-31 DIAGNOSIS — E78.5 HYPERLIPIDEMIA LDL GOAL <130: Primary | ICD-10-CM

## 2025-04-21 ENCOUNTER — OFFICE VISIT (OUTPATIENT)
Dept: AUDIOLOGY | Facility: CLINIC | Age: 65
End: 2025-04-21
Payer: COMMERCIAL

## 2025-04-21 DIAGNOSIS — H93.13 TINNITUS OF BOTH EARS: ICD-10-CM

## 2025-04-21 DIAGNOSIS — H90.3 SENSORINEURAL HEARING LOSS (SNHL) OF BOTH EARS: Primary | ICD-10-CM

## 2025-04-21 PROCEDURE — 92557 COMPREHENSIVE HEARING TEST: CPT | Performed by: AUDIOLOGIST

## 2025-04-21 PROCEDURE — 92550 TYMPANOMETRY & REFLEX THRESH: CPT | Performed by: AUDIOLOGIST

## 2025-04-21 NOTE — PROGRESS NOTES
AUDIOLOGY REPORT    SUBJECTIVE:  Bi Bae is a 64 year old male who was seen in the Audiology Clinic at the [facility] for audiologic evaluation, self-referred. The patient has been seen previously in this clinic on 4-12-21 for assessment and results indicated mild-moderate high frequency sensorineural hearing loss, bilaterally. The patient reported no subjective changes to ears/hearing in the interim, but that his wife feels he is struggling to hear. The patient denied aural fullness, dizziness/vertigo, otalgia, otorrhea, recent illness, or history of noise exposure.     OBJECTIVE:  Abuse Screening:  Do you feel unsafe at home or work/school? No  Do you feel threatened by someone? No  Does anyone try to keep you from having contact with others, or doing things outside of your home? No  Physical signs of abuse present? No     Fall Risk Screen:  1. Have you fallen two or more times in the past year? No  2. Have you fallen and had an injury in the past year? No    Otoscopic exam indicates ears are clear of cerumen, bilaterally. Exostoses/osteomas present bilaterally, but not occluding (patient reported he is a frequent swimmer/has a pool).    Pure Tone Thresholds assessed using conventional audiometry with good  reliability from 250-8000 Hz bilaterally using insert earphones and circumaural headphones.     RIGHT:  normal sloping to mild-moderate sensorineural hearing loss    LEFT:    normal sloping to mild-moderate sensorineural hearing loss    Tympanogram:    RIGHT: normal eardrum mobility    LEFT:   normal eardrum mobility    Reflexes for 1000 Hz (reported by stimulus ear):  RIGHT: Ipsilateral is present at normal levels  RIGHT: Contralateral is present at normal levels  LEFT:   Ipsilateral is present at normal levels  LEFT:   Contralateral is present at normal levels      Speech Reception Threshold:    RIGHT: 15 dB HL    LEFT:   15 dB HL  Word Recognition Score:     RIGHT: 100% at 50  dB HL using NU-6 recorded word list.    LEFT:   100% at 50 dB HL using NU-6 recorded word list.      ASSESSMENT:     ICD-10-CM    1. Sensorineural hearing loss (SNHL) of both ears  H90.3       2. Tinnitus of both ears  H93.13           Compared to patient's previous audiogram dated 4-12-21, hearing thresholds have remained stable, bilaterally. Today s results were discussed with the patient in detail. Appropriate communication strategies were discussed at length, as were reasonable expectations regarding hearing at a distance, in noisy environments, or when attention is diverted elsewhere.      PLAN:  Patient was counseled regarding hearing loss and impact on communication.  Patient is a potential candidate for binaural amplification at this time; however, he indicated he is currently not interested in pursuing hearing aids.     Mr. Bae should return for hearing evaluation in 1-2 years to monitor the hearing loss. Wear hearing protection consistently in noise to preserve residual hearing sensitivity and to minimize the effects of noise on tinnitus. Mr. Bae expressed verbal understanding of this information and plan. Please call this clinic with questions regarding these results or recommendations.        Eliud Alegre, [facility]  [place] Licensed Audiologist [id number]

## 2025-04-28 ENCOUNTER — TRANSFERRED RECORDS (OUTPATIENT)
Dept: HEALTH INFORMATION MANAGEMENT | Facility: CLINIC | Age: 65
End: 2025-04-28
Payer: COMMERCIAL

## 2025-08-04 ENCOUNTER — TELEPHONE (OUTPATIENT)
Dept: FAMILY MEDICINE | Facility: CLINIC | Age: 65
End: 2025-08-04
Payer: COMMERCIAL

## 2025-08-17 DIAGNOSIS — J31.0 CHRONIC RHINITIS: ICD-10-CM

## 2025-08-18 RX ORDER — FLUTICASONE PROPIONATE 50 MCG
SPRAY, SUSPENSION (ML) NASAL
Qty: 32 G | Refills: 2 | Status: SHIPPED | OUTPATIENT
Start: 2025-08-18

## 2025-08-30 DIAGNOSIS — K21.9 GASTROESOPHAGEAL REFLUX DISEASE WITHOUT ESOPHAGITIS: ICD-10-CM

## 2025-09-02 RX ORDER — SUCRALFATE 1 G/1
1 TABLET ORAL DAILY
Qty: 90 TABLET | Refills: 3 | Status: SHIPPED | OUTPATIENT
Start: 2025-09-02